# Patient Record
Sex: FEMALE
[De-identification: names, ages, dates, MRNs, and addresses within clinical notes are randomized per-mention and may not be internally consistent; named-entity substitution may affect disease eponyms.]

---

## 2019-01-11 ENCOUNTER — HOSPITAL ENCOUNTER (EMERGENCY)
Dept: HOSPITAL 97 - ER | Age: 39
Discharge: HOME | End: 2019-01-11
Payer: SELF-PAY

## 2019-01-11 VITALS — OXYGEN SATURATION: 98 % | DIASTOLIC BLOOD PRESSURE: 77 MMHG | SYSTOLIC BLOOD PRESSURE: 105 MMHG

## 2019-01-11 VITALS — TEMPERATURE: 98.7 F

## 2019-01-11 DIAGNOSIS — J06.9: Primary | ICD-10-CM

## 2019-01-11 DIAGNOSIS — H69.80: ICD-10-CM

## 2019-01-11 PROCEDURE — 99282 EMERGENCY DEPT VISIT SF MDM: CPT

## 2019-01-11 NOTE — EDPHYS
Physician Documentation                                                                           

 [facility]                                                                

Name: Mary Cantu                                                                                  

Age: 38 yrs                                                                                       

Sex: Female                                                                                       

: 1980                                                                                   

MRN: [id number]                                                                                   

Arrival Date: 2019                                                                          

Time: 12:57                                                                                       

Account#: [id number]                                                                            

Bed 30                                                                                            

Private MD:                                                                                       

ED Physician Danish Cardozo                                                                       

HPI:                                                                                              

                                                                                             

13:41 This 38 yrs old  Female presents to ER via Ambulatory with complaints of        jr8 

      Shortness Of Breath, Cough.                                                                 

13:41 The patient or guardian reports cough, that is intermittent, described as mild, with no jr8 

      sputum. Onset: The symptoms/episode began/occurred gradually, 1 week(s) ago. Severity       

      of symptoms: At their worst the symptoms were mild, in the emergency department the         

      symptoms are unchanged. Modifying factors: The symptoms are alleviated by nothing, the      

      symptoms are aggravated by nothing. Associated signs and symptoms: Pertinent positives:     

      rhinorrhea, sore throat. The patient has not experienced similar symptoms in the past.      

      The patient has been recently seen by a physician:. was put on amoxil but still having      

      ear pain and congestion .                                                                   

                                                                                                  

OB/GYN:                                                                                           

13:21 LMP 2018                                                                          ph  

                                                                                                  

Historical:                                                                                       

- Allergies:                                                                                      

13:21 Motrin;                                                                                 ph  

- Home Meds:                                                                                      

13:21 None [Active];                                                                          ph  

- PMHx:                                                                                           

13:21 High Cholesterol;                                                                       ph  

- PSHx:                                                                                           

13:21 None;                                                                                   ph  

                                                                                                  

- Immunization history:: Adult Immunizations unknown.                                             

- Social history:: Smoking status: Patient/guardian denies using tobacco.                         

- Ebola Screening: : No symptoms or risks identified at this time.                                

                                                                                                  

                                                                                                  

ROS:                                                                                              

13:41 Eyes: Negative for injury, pain, redness, and discharge, Neck: Negative for injury,     jr8 

      pain, and swelling, Cardiovascular: Negative for chest pain, palpitations, and edema,       

      Abdomen/GI: Negative for abdominal pain, nausea, vomiting, diarrhea, and constipation,      

      Back: Negative for injury and pain, MS/Extremity: Negative for injury and deformity,        

      Skin: Negative for injury, rash, and discoloration, Neuro: Negative for headache,           

      weakness, numbness, tingling, and seizure.                                                  

13:41 ENT: Positive for ear pain, rhinorrhea, sinus congestion, sore throat, Negative for         

      drainage from ear(s), sinus pain.                                                           

13:41 Respiratory: Positive for cough, Negative for shortness of breath, sputum production,       

      wheezing.                                                                                   

                                                                                                  

Exam:                                                                                             

13:41 Eyes:  Pupils equal round and reactive to light, extra-ocular motions intact.  Lids and jr8 

      lashes normal.  Conjunctiva and sclera are non-icteric and not injected.  Cornea within     

      normal limits.  Periorbital areas with no swelling, redness, or edema. ENT:  Nares          

      patent. No nasal discharge, no septal abnormalities noted.  Tympanic membranes are          

      normal and external auditory canals are clear.  Oropharynx with no redness, swelling,       

      or masses, exudates, or evidence of obstruction, uvula midline.  Mucous membranes           

      moist. Neck:  Trachea midline, no thyromegaly or masses palpated, and no cervical           

      lymphadenopathy.  Supple, full range of motion without nuchal rigidity, or vertebral        

      point tenderness.  No Meningismus. Cardiovascular:  Regular rate and rhythm with a          

      normal S1 and S2.  No gallops, murmurs, or rubs.  Normal PMI, no JVD.  No pulse             

      deficits. Respiratory:  Lungs have equal breath sounds bilaterally, clear to                

      auscultation and percussion.  No rales, rhonchi or wheezes noted.  No increased work of     

      breathing, no retractions or nasal flaring. Abdomen/GI:  Soft, non-tender, with normal      

      bowel sounds.  No distension or tympany.  No guarding or rebound.  No evidence of           

      tenderness throughout. Back:  No spinal tenderness.  No costovertebral tenderness.          

      Full range of motion. Skin:  Warm, dry with normal turgor.  Normal color with no            

      rashes, no lesions, and no evidence of cellulitis. MS/ Extremity:  Pulses equal, no         

      cyanosis.  Neurovascular intact.  Full, normal range of motion. Neuro:  Awake and           

      alert, GCS 15, oriented to person, place, time, and situation.  Cranial nerves II-XII       

      grossly intact.  Motor strength 5/5 in all extremities.  Sensory grossly intact.            

      Cerebellar exam normal.  Normal gait.                                                       

                                                                                                  

Vital Signs:                                                                                      

13:21  / 97; Pulse 76; Resp 20; Temp 98.7; Pulse Ox 100% on R/A; Weight 58.97 kg;       ph  

14:12  / 77; Pulse 81; Resp 18; Pulse Ox 98% ;                                          tl3 

                                                                                                  

MDM:                                                                                              

13:24 Patient medically screened.                                                             jr8 

13:41 Data reviewed: vital signs, nurses notes, and as a result, I will discharge patient.    jr8 

      Data interpreted: Pulse oximetry: on room air is 100 %. Interpretation: normal.             

      Counseling: I had a detailed discussion with the patient and/or guardian regarding: the     

      historical points, exam findings, and any diagnostic results supporting the                 

      discharge/admit diagnosis, the need for outpatient follow up, a family practitioner, to     

      return to the emergency department if symptoms worsen or persist or if there are any        

      questions or concerns that arise at home.                                                   

                                                                                                  

Administered Medications:                                                                         

No medications were administered                                                                  

                                                                                                  

                                                                                                  

Disposition:                                                                                      

                                                                                             

09:23 Co-signature as Attending Physician, Danish Cardozo MD.                                    

                                                                                                  

Disposition:                                                                                      

19 13:44 Discharged to Home. Impression: Eustachian Tube Dysfunction , Acute upper          

  respiratory infection, unspecified.                                                             

- Condition is Stable.                                                                            

- Discharge Instructions: Upper Respiratory Infection, Adult.                                     

- Prescriptions for Prednisone 20 mg Oral Tablet - take 1 tablet by ORAL route once               

  daily for 5 days; 5 tablet. Claritin- D 24 Hour  mg Oral Tablet Sustained                 

  Release 24 hr - take 1 tablet by ORAL route once daily As needed; 20 tablet.                    

  Guaifenesin AC 10- 100 mg/5 mL Oral Liquid - take 10 milliliter by ORAL route every 4           

  hours As needed; 240 milliliter.                                                                

- Medication Reconciliation Form, Thank You Letter, Antibiotic Education, Prescription            

  Opioid Use, Work release form form.                                                             

- Follow up: Private Physician; When: As needed; Reason: Recheck today's complaints,              

  Continuance of care, Re-evaluation by your physician.                                           

- Problem is new.                                                                                 

- Symptoms have improved.                                                                         

                                                                                                  

                                                                                                  

                                                                                                  

Signatures:                                                                                       

Solo Turner PA PA   jr8                                                  

Ame Martínez RN RN                                                      

Danish Cardozo MD MD                                                      

Krissy Mohr RN                       RN   tl3                                                  

                                                                                                  

Corrections: (The following items were deleted from the chart)                                    

                                                                                             

14:15 13:44 2019 13:44 Discharged to Home. Impression: Eustachian Tube Dysfunction ;    tl3 

      Acute upper respiratory infection, unspecified. Condition is Stable. Forms are              

      Medication Reconciliation Form, Thank You Letter, Antibiotic Education, Prescription        

      Opioid Use. Follow up: Private Physician; When: As needed; Reason: Recheck today's          

      complaints, Continuance of care, Re-evaluation by your physician. Problem is new.           

      Symptoms have improved. jr8                                                                 

                                                                                                  

**************************************************************************************************

## 2019-01-11 NOTE — ER
Nurse's Notes                                                                                     

 [facility]                                                                

Name: Mary Cantu                                                                                  

Age: 38 yrs                                                                                       

Sex: Female                                                                                       

: 1980                                                                                   

MRN: [id number]                                                                                   

Arrival Date: 2019                                                                          

Time: 12:57                                                                                       

Account#: [id number]                                                                            

Bed 30                                                                                            

Private MD:                                                                                       

Diagnosis: Eustachian Tube Dysfunction ;Acute upper respiratory infection, unspecified            

                                                                                                  

Presentation:                                                                                     

                                                                                             

13:19 Presenting complaint: Patient states: Productive cough, chloe ear pain, SOB, and sore     ph  

      throat x 1 week, states, " I went to the DR and they gave me some antibiotics but they      

      aren't helping" Pt currently taking Augmentin and Montelukast, denies fever, N/V/D.         

      Transition of care: patient was not received from another setting of care. Onset of         

      symptoms was 2019. Risk Assessment: Do you want to hurt yourself or someone     

      else? Patient reports no desire to harm self or others. Initial Sepsis Screen: Does the     

      patient meet any 2 criteria? No. Patient's initial sepsis screen is negative. Does the      

      patient have a suspected source of infection? No. Patient's initial sepsis screen is        

      negative. Care prior to arrival: None.                                                      

13:19 Method Of Arrival: Ambulatory                                                           ph  

13:19 Acuity: GASPER 4                                                                           ph  

                                                                                                  

Triage Assessment:                                                                                

14:14 Respiratory: Onset: The symptoms/episode began/occurred three days, the patient has     tl3 

      moderate shortness of breath.                                                               

                                                                                                  

OB/GYN:                                                                                           

13:21 LMP 2018                                                                          ph  

                                                                                                  

Historical:                                                                                       

- Allergies:                                                                                      

13:21 Motrin;                                                                                 ph  

- Home Meds:                                                                                      

13:21 None [Active];                                                                          ph  

- PMHx:                                                                                           

13:21 High Cholesterol;                                                                       ph  

- PSHx:                                                                                           

13:21 None;                                                                                   ph  

                                                                                                  

- Immunization history:: Adult Immunizations unknown.                                             

- Social history:: Smoking status: Patient/guardian denies using tobacco.                         

- Ebola Screening: : No symptoms or risks identified at this time.                                

                                                                                                  

                                                                                                  

Screenin:12 Abuse screen: Denies threats or abuse. Nutritional screening: No deficits noted.        tl3 

      Tuberculosis screening: No symptoms or risk factors identified. Fall Risk None              

      identified.                                                                                 

                                                                                                  

Assessment:                                                                                       

13:30 Reassessment: pt was seen by PCP yesterday and put on Singulair and Augmentin, S/S      tl3 

      persisting not feeling any better. General: Appears in no apparent distress.                

      comfortable, well groomed, well developed, well nourished, Behavior is calm,                

      cooperative, appropriate for age. Pain: Denies pain. Neuro: Level of Consciousness is       

      awake, alert, obeys commands, Oriented to person, place, time, situation, Appropriate       

      for age. Cardiovascular: Patient's skin is warm and dry. Rhythm is regular.                 

      Cardiovascular: Heart tones S1 S2 present. Respiratory: Airway is patent Respiratory        

      effort is even, unlabored, Respiratory pattern is regular, symmetrical, Breath sounds       

      are clear bilaterally. Respiratory: Reports cough that is. GI: No signs and/or symptoms     

      were reported involving the gastrointestinal system. : No signs and/or symptoms were      

      reported regarding the genitourinary system. EENT: Reports nasal congestion. Derm: No       

      signs and/or symptoms reported regarding the dermatologic system.                           

14:12 Reassessment: Patient appears in no apparent distress at this time. No changes from     tl3 

      previously documented assessment. Patient and/or family updated on plan of care and         

      expected duration. Pain level reassessed. Patient is alert, oriented x 3, equal             

      unlabored respirations, skin warm/dry/pink.                                                 

                                                                                                  

Vital Signs:                                                                                      

13:21  / 97; Pulse 76; Resp 20; Temp 98.7; Pulse Ox 100% on R/A; Weight 58.97 kg;       ph  

14:12  / 77; Pulse 81; Resp 18; Pulse Ox 98% ;                                          tl3 

                                                                                                  

ED Course:                                                                                        

12:57 Patient arrived in ED.                                                                  as  

13:18 Krissy Mohr, RN is Primary Nurse.                                                     tl3 

13:21 Triage completed.                                                                       ph  

13:22 Arm band placed on Patient placed in an exam room.                                      ph  

13:24 [facility]                                                               jr8 

13:24 Danish Cardozo MD is Attending Physician.                                              jr8 

14:12 Patient has correct armband on for positive identification. Diet tray ordered.          tl3 

14:12 No provider procedures requiring assistance completed. Patient did not have IV access   tl3 

      during this emergency room visit.                                                           

                                                                                                  

Administered Medications:                                                                         

No medications were administered                                                                  

                                                                                                  

                                                                                                  

Outcome:                                                                                          

13:44 Discharge ordered by MD.                                                                jr8 

14:12 Discharged to home ambulatory.                                                          tl3 

14:12 Condition: stable                                                                           

14:12 Discharge instructions given to patient, Instructed on discharge instructions, follow       

      up and referral plans. medication usage, Demonstrated understanding of instructions,        

      follow-up care, medications, Prescriptions given X 3.                                       

14:15 Patient left the ED.                                                                    tl3 

                                                                                                  

Signatures:                                                                                       

Georgiana Douglas Josh, PA PA   jr8                                                  

Ame Martínez, RN                      RN   ph                                                   

Krissy Mohr RN                       RN   tl3                                                  

                                                                                                  

**************************************************************************************************

## 2020-02-16 ENCOUNTER — HOSPITAL ENCOUNTER (EMERGENCY)
Dept: HOSPITAL 97 - ER | Age: 40
Discharge: HOME | End: 2020-02-16
Payer: SELF-PAY

## 2020-02-16 VITALS — TEMPERATURE: 98.6 F | DIASTOLIC BLOOD PRESSURE: 76 MMHG | SYSTOLIC BLOOD PRESSURE: 113 MMHG

## 2020-02-16 VITALS — OXYGEN SATURATION: 100 %

## 2020-02-16 DIAGNOSIS — J10.1: Primary | ICD-10-CM

## 2020-02-16 DIAGNOSIS — Z88.6: ICD-10-CM

## 2020-02-16 DIAGNOSIS — E78.00: ICD-10-CM

## 2020-02-16 PROCEDURE — 99282 EMERGENCY DEPT VISIT SF MDM: CPT

## 2020-02-16 NOTE — ER
Nurse's Notes                                                                                     

 [facility]                                                                 

Name: Mary Cantu                                                                                  

Age: 39 yrs                                                                                       

Sex: Female                                                                                       

: 1980                                                                                   

MRN: [id number]                                                                                   

Arrival Date: 2020                                                                          

Time: 16:16                                                                                       

Account#: [id number]                                                                            

Bed 7                                                                                             

Private MD:                                                                                       

Diagnosis: Influenza due to certain identified influenza viruses                                  

                                                                                                  

Presentation:                                                                                     

                                                                                             

16:17 Presenting complaint: Patient states: Fever. chills, cough, congestion, and sneezing    aj1 

      since yesterday. States that she went to the doctor yesterday and she was diagnosed         

      with a cold. States that she was tested for the flu and it came back negative. States       

      that she came to the emergency room because she doesn't feel better yet. Transition of      

      care: patient was not received from another setting of care. Onset of symptoms was          

      2020. Risk Assessment: Do you want to hurt yourself or someone else? Patient       

      reports no desire to harm self or others. Initial Sepsis Screen: Does the patient meet      

      any 2 criteria? No. Patient's initial sepsis screen is negative. Does the patient have      

      a suspected source of infection? Yes: Productive cough/pneumonia. Care prior to             

      arrival: None.                                                                              

16:17 Method Of Arrival: Ambulatory                                                           aj 

16:17 Acuity: GASPER 5                                                                           aj1 

                                                                                                  

Triage Assessment:                                                                                

16:19 General: Appears in no apparent distress. comfortable, Behavior is calm, cooperative,   aj1 

      appropriate for age. Pain: Pain currently is 2 out of 10 on a pain scale. Neuro: Level      

      of Consciousness is awake, alert, obeys commands. Cardiovascular: Patient's skin is         

      warm and dry. Respiratory: Airway is patent Respiratory effort is even, unlabored,          

      Respiratory pattern is regular, symmetrical.                                                

                                                                                                  

OB/GYN:                                                                                           

16:19 LMP 2020                                                                           aj1 

                                                                                                  

Historical:                                                                                       

- Allergies:                                                                                      

16:19 Motrin;                                                                                 aj1 

- Home Meds:                                                                                      

16:19 None [Active];                                                                          aj1 

- PMHx:                                                                                           

16:19 High Cholesterol;                                                                       aj1 

- PSHx:                                                                                           

16:19 None;                                                                                   aj1 

                                                                                                  

- Immunization history:: Flu vaccine is up to date.                                               

- Coronavirus screen:: The patient has NOT traveled to [place] in the past 14 days.                 

- Social history:: Smoking status: Patient/guardian denies using tobacco.                         

- Ebola Screening: : Patient denies travel to an Ebola-affected area in the 21 days               

  before illness onset.                                                                           

                                                                                                  

                                                                                                  

Screenin:41 Abuse screen: Denies threats or abuse. Denies injuries from another. Nutritional        mg2 

      screening: No deficits noted. Tuberculosis screening: No symptoms or risk factors           

      identified. Fall Risk None identified.                                                      

                                                                                                  

Assessment:                                                                                       

16:40 General: Appears in no apparent distress. comfortable, Behavior is calm, cooperative.   mg2 

      Pain: Denies pain. Neuro: Level of Consciousness is awake, alert, obeys commands,           

      Oriented to person, place, time, situation. Cardiovascular: Capillary refill < 3            

      seconds Patient's skin is warm and dry. Respiratory: Airway is patent Respiratory           

      effort is even, unlabored, Respiratory pattern is regular, symmetrical. Respiratory:        

      Reports cough that is. GI: No signs and/or symptoms were reported involving the             

      gastrointestinal system. : No signs and/or symptoms were reported regarding the           

      genitourinary system. EENT: No signs and/or symptoms were reported regarding the EENT       

      system. Derm: Skin is intact, is healthy with good turgor, Skin is pink, warm \T\ dry.      

      normal. Musculoskeletal: Circulation, motion, and sensation intact. Capillary refill <      

      3 seconds.                                                                                  

                                                                                                  

Vital Signs:                                                                                      

16:19  / 82; Pulse 75; Resp 18; Temp 98.0; Pulse Ox 100% on R/A; Weight 59.42 kg (R);   aj1 

      Height 4 ft. 9 in. (144.78 cm) (R); Pain 3/10;                                              

16:41  / 76; Pulse 80; Resp 18; Temp 98.6(TE); Pulse Ox 100% on R/A;                    mg2 

16:19 Body Mass Index 28.35 (59.42 kg, 144.78 cm)                                             aj1 

                                                                                                  

ED Course:                                                                                        

16:16 Patient arrived in ED.                                                                  mr  

16:19 Triage completed.                                                                       aj1 

16:19 Arm band placed on Patient placed in waiting room, Patient notified of wait time.       aj1 

16:22 Solo Turner PA is PHCP.                                                               jr8 

16:22 Minh Rankin MD is Attending Physician.                                             jr8 

16:32 Luis Lo, PARMJIT is Primary Nurse.                                                  mg2 

16:41 Patient has correct armband on for positive identification. Door closed.                mg2 

16:41 No provider procedures requiring assistance completed. Patient did not have IV access   mg2 

      during this emergency room visit.                                                           

                                                                                                  

Administered Medications:                                                                         

No medications were administered                                                                  

                                                                                                  

                                                                                                  

Outcome:                                                                                          

16:29 Discharge ordered by MD.                                                                jr8 

16:41 Discharged to home ambulatory.                                                          mg2 

16:41 Condition: stable                                                                           

16:41 Discharge instructions given to patient, Instructed on discharge instructions, follow       

      up and referral plans. medication usage, Demonstrated understanding of instructions,        

      follow-up care, medications, Prescriptions given X 3.                                       

16:42 Patient left the ED.                                                                    mg2 

                                                                                                  

Signatures:                                                                                       

Kym Gray RN                     RN   aj1                                                  

Salina Mcneal                                 mr                                                   

Solo Turner PA                        PA   jr8                                                  

Luis Lo RN                    RN   mg2                                                  

                                                                                                  

**************************************************************************************************

## 2020-02-16 NOTE — EDPHYS
Physician Documentation                                                                           

 [facility]                                                                 

Name: Mary Cantu                                                                                  

Age: 39 yrs                                                                                       

Sex: Female                                                                                       

: 1980                                                                                   

MRN: [id number]                                                                                   

Arrival Date: 2020                                                                          

Time: 16:16                                                                                       

Account#: [id number]                                                                            

Bed 7                                                                                             

Private MD:                                                                                       

ED Physician Minh Rankin                                                                      

HPI:                                                                                              

                                                                                             

16:27 This 39 yrs old  Female presents to ER via Ambulatory with complaints of Flu    jr8 

      Symptoms.                                                                                   

16:27 Onset: The symptoms/episode began/occurred acutely, yesterday. Associated signs and     jr8 

      symptoms: Pertinent positives: cough, fever, sore throat. Modifying factors: The            

      patient symptoms are alleviated by nothing, the patient symptoms are aggravated by          

      nothing. The patient has not experienced similar symptoms in the past. The patient has      

      been recently seen by a physician:. saw pcp yesterday and was flu swabbed. Was              

      negative. Given two shots and sent home. Stated that she feels no better and wants          

      reevaluation. Son of patient recently diagnosed with influenza in this ED .                 

                                                                                                  

OB/GYN:                                                                                           

16:19 LMP 2020                                                                           aj1 

                                                                                                  

Historical:                                                                                       

- Allergies:                                                                                      

16:19 Motrin;                                                                                 aj1 

- Home Meds:                                                                                      

16:19 None [Active];                                                                          aj1 

- PMHx:                                                                                           

16:19 High Cholesterol;                                                                       aj1 

- PSHx:                                                                                           

16:19 None;                                                                                   aj1 

                                                                                                  

- Immunization history:: Flu vaccine is up to date.                                               

- Coronavirus screen:: The patient has NOT traveled to [place] in the past 14 days.                 

- Social history:: Smoking status: Patient/guardian denies using tobacco.                         

- Ebola Screening: : Patient denies travel to an Ebola-affected area in the 21 days               

  before illness onset.                                                                           

                                                                                                  

                                                                                                  

ROS:                                                                                              

16:27 Eyes: Negative for injury, pain, redness, and discharge, Neck: Negative for injury,     jr8 

      pain, and swelling, Cardiovascular: Negative for chest pain, palpitations, and edema,       

      Abdomen/GI: Negative for abdominal pain, nausea, vomiting, diarrhea, and constipation,      

      Back: Negative for injury and pain, MS/Extremity: Negative for injury and deformity,        

      Skin: Negative for injury, rash, and discoloration.                                         

16:27 Constitutional: Positive for body aches, chills, fever, malaise.                            

16:27 ENT: Positive for rhinorrhea, sinus congestion, sore throat.                                

16:27 Respiratory: Positive for cough, Negative for dyspnea on exertion, shortness of breath,     

      sputum production, wheezing.                                                                

16:27 Neuro: Positive for headache.                                                               

                                                                                                  

Exam:                                                                                             

16:27 Constitutional:  This is a well developed, well nourished patient who is awake, alert,  jr8 

      and in no acute distress. Eyes:  Pupils equal round and reactive to light, extra-ocular     

      motions intact.  Lids and lashes normal.  Conjunctiva and sclera are non-icteric and        

      not injected.  Cornea within normal limits.  Periorbital areas with no swelling,            

      redness, or edema. Neck:  Trachea midline, no thyromegaly or masses palpated, and no        

      cervical lymphadenopathy.  Supple, full range of motion without nuchal rigidity, or         

      vertebral point tenderness.  No Meningismus. Cardiovascular:  Regular rate and rhythm       

      with a normal S1 and S2.  No gallops, murmurs, or rubs.  Normal PMI, no JVD.  No pulse      

      deficits. Respiratory:  Lungs have equal breath sounds bilaterally, clear to                

      auscultation and percussion.  No rales, rhonchi or wheezes noted.  No increased work of     

      breathing, no retractions or nasal flaring. Abdomen/GI:  Soft, non-tender, with normal      

      bowel sounds.  No distension or tympany.  No guarding or rebound.  No evidence of           

      tenderness throughout. Back:  No spinal tenderness.  No costovertebral tenderness.          

      Full range of motion. Skin:  Warm, dry with normal turgor.  Normal color with no            

      rashes, no lesions, and no evidence of cellulitis. MS/ Extremity:  Pulses equal, no         

      cyanosis.  Neurovascular intact.  Full, normal range of motion. Neuro:  Awake and           

      alert, GCS 15, oriented to person, place, time, and situation.  Cranial nerves II-XII       

      grossly intact.  Motor strength 5/5 in all extremities.  Sensory grossly intact.            

      Cerebellar exam normal.  Normal gait.                                                       

16:27 ENT: Exam is negative for earache, ear discharge, TM abnormalities, nasal discharge,        

      Mouth: Lips: moist, Oral mucosa: pink and intact, moist, Gums: pink, Tongue: is moist,      

      Posterior pharynx: Airway: patent, Tonsils: are normal in appearance, no enlargement,       

      no erythema, no exudate, no ulcerations, Uvula: midline, non-edematous, no erythema,        

      swelling, is not appreciated, erythema, that is mild.                                       

                                                                                                  

Vital Signs:                                                                                      

16:19  / 82; Pulse 75; Resp 18; Temp 98.0; Pulse Ox 100% on R/A; Weight 59.42 kg (R);   aj1 

      Height 4 ft. 9 in. (144.78 cm) (R); Pain 3/10;                                              

16:41  / 76; Pulse 80; Resp 18; Temp 98.6(TE); Pulse Ox 100% on R/A;                    mg2 

16:19 Body Mass Index 28.35 (59.42 kg, 144.78 cm)                                             aj1 

                                                                                                  

MDM:                                                                                              

16:23 Patient medically screened.                                                             jr8 

16:27 Data reviewed: vital signs, nurses notes, and as a result, I will discharge patient.    jr8 

      Data interpreted: Pulse oximetry: on room air is 100 %. Interpretation: normal.             

      Counseling: I had a detailed discussion with the patient and/or guardian regarding: the     

      historical points, exam findings, and any diagnostic results supporting the                 

      discharge/admit diagnosis, the need for outpatient follow up, a family practitioner, to     

      return to the emergency department if symptoms worsen or persist or if there are any        

      questions or concerns that arise at home.                                                   

                                                                                                  

Administered Medications:                                                                         

No medications were administered                                                                  

                                                                                                  

                                                                                                  

Disposition:                                                                                      

                                                                                             

08:13 Co-signature as Attending Physician, Minh Rankin MD I agree with the assessment and  brigid 

      plan of care.                                                                               

                                                                                                  

Disposition:                                                                                      

20 16:29 Discharged to Home. Impression: Influenza due to certain identified influenza      

  viruses.                                                                                        

- Condition is Stable.                                                                            

- Discharge Instructions: Influenza, Adult.                                                       

- Prescriptions for Tamiflu 75 mg Oral Capsule - take 1 capsule by ORAL route every 12            

  hours for 5 days; 10 capsule. Prednisone 20 mg Oral Tablet - take 2 tablet by ORAL              

  route once daily for 5 days; 10 tablet. Guaifenesin AC 10- 100 mg/5 mL Oral Liquid -            

  take 10 milliliter by ORAL route every 4 hours As needed; 240 milliliter.                       

- Medication Reconciliation Form, Thank You Letter, Antibiotic Education, Prescription            

  Opioid Use, Work release form form.                                                             

- Follow up: Private Physician; When: 5 - 6 days; Reason: Recheck today's complaints,             

  Continuance of care, Re-evaluation by your physician.                                           

- Problem is new.                                                                                 

- Symptoms have improved.                                                                         

                                                                                                  

                                                                                                  

                                                                                                  

Signatures:                                                                                       

Kym Gray RN                     RN   aj1                                                  

Minh Rankin MD MD cha Roszak, Josh, PA                        PA   jr8                                                  

Luis Lo RN                    RN   mg2                                                  

                                                                                                  

Corrections: (The following items were deleted from the chart)                                    

                                                                                             

16:42 16:29 2020 16:29 Discharged to Home. Impression: Influenza due to certain         mg2 

      identified influenza viruses. Condition is Stable. Forms are Medication Reconciliation      

      Form, Thank You Letter, Antibiotic Education, Prescription Opioid Use. Follow up:           

      Private Physician; When: 5 - 6 days; Reason: Recheck today's complaints, Continuance of     

      care, Re-evaluation by your physician. Problem is new. Symptoms have improved. jr8          

                                                                                                  

**************************************************************************************************

## 2022-12-06 ENCOUNTER — HOSPITAL ENCOUNTER (EMERGENCY)
Dept: HOSPITAL 97 - ER | Age: 42
Discharge: HOME | End: 2022-12-06
Payer: SELF-PAY

## 2022-12-06 DIAGNOSIS — R07.89: Primary | ICD-10-CM

## 2022-12-06 DIAGNOSIS — Z88.6: ICD-10-CM

## 2022-12-06 LAB
ALBUMIN SERPL BCP-MCNC: 4 G/DL (ref 3.4–5)
ALP SERPL-CCNC: 61 U/L (ref 45–117)
ALT SERPL W P-5'-P-CCNC: 25 U/L (ref 12–78)
AST SERPL W P-5'-P-CCNC: 19 U/L (ref 15–37)
BUN BLD-MCNC: 12 MG/DL (ref 7–18)
GLUCOSE SERPLBLD-MCNC: 94 MG/DL (ref 74–106)
HCT VFR BLD CALC: 36.7 % (ref 36–45)
INR BLD: 0.97
LYMPHOCYTES # SPEC AUTO: 2.2 K/UL (ref 0.7–4.9)
MAGNESIUM SERPL-MCNC: 2.4 MG/DL (ref 1.8–2.4)
MCV RBC: 89.3 FL (ref 80–100)
NT-PROBNP SERPL-MCNC: 29 PG/ML (ref ?–125)
PMV BLD: 7.9 FL (ref 7.6–11.3)
POTASSIUM SERPL-SCNC: 3.4 MMOL/L (ref 3.5–5.1)
RBC # BLD: 4.11 M/UL (ref 3.86–4.86)
TROPONIN I SERPL HS-MCNC: 3.5 PG/ML (ref ?–58.9)

## 2022-12-06 PROCEDURE — 83880 ASSAY OF NATRIURETIC PEPTIDE: CPT

## 2022-12-06 PROCEDURE — 80076 HEPATIC FUNCTION PANEL: CPT

## 2022-12-06 PROCEDURE — 93005 ELECTROCARDIOGRAM TRACING: CPT

## 2022-12-06 PROCEDURE — 36415 COLL VENOUS BLD VENIPUNCTURE: CPT

## 2022-12-06 PROCEDURE — 85610 PROTHROMBIN TIME: CPT

## 2022-12-06 PROCEDURE — 83735 ASSAY OF MAGNESIUM: CPT

## 2022-12-06 PROCEDURE — 84484 ASSAY OF TROPONIN QUANT: CPT

## 2022-12-06 PROCEDURE — 99284 EMERGENCY DEPT VISIT MOD MDM: CPT

## 2022-12-06 PROCEDURE — 85025 COMPLETE CBC W/AUTO DIFF WBC: CPT

## 2022-12-06 PROCEDURE — 71045 X-RAY EXAM CHEST 1 VIEW: CPT

## 2022-12-06 PROCEDURE — 80048 BASIC METABOLIC PNL TOTAL CA: CPT

## 2022-12-06 NOTE — RAD REPORT
EXAM DESCRIPTION:  [facility]ill[place] Single View12/6/2022 4:07 pm

 

CLINICAL HISTORY:  Chest pain

 

COMPARISON:  2015

 

FINDINGS:   The lungs appear clear of acute infiltrate. The heart is normal size

 

IMPRESSION:   No acute abnormalities displayed

## 2022-12-06 NOTE — ER
Nurse's Notes                                                                                     

 [facility]                                                                 

Name: Mary Cantu                                                                                  

Age: 42 yrs                                                                                       

Sex: Female                                                                                       

: 1980                                                                                   

MRN: [id number]                                                                                   

Arrival Date: 2022                                                                          

Time: 15:33                                                                                       

Account#: [id number]                                                                            

Bed 17                                                                                            

Private MD:                                                                                       

Diagnosis: Chest pain, unspecified                                                                

                                                                                                  

Presentation:                                                                                     

                                                                                             

15:41 Chief complaint: Patient states: chest pain since 0530 this morning on and off that     jh5 

      feels like heavy pressure and squeezing. Coronavirus screen: Vaccine status: Patient        

      reports receiving the 2nd dose of the covid vaccine. Client denies travel out of the        

      [place] in the last 14 days. Ebola Screen: Patient negative for fever greater than or          

      equal to 101.5 degrees Fahrenheit, and additional compatible Ebola Virus Disease            

      symptoms Patient denies exposure to infectious person. Patient denies travel to an          

      Ebola-affected area in the 21 days before illness onset. Initial Sepsis Screen: Does        

      the patient meet any 2 criteria? No. Patient's initial sepsis screen is negative. Does      

      the patient have a suspected source of infection? No. Patient's initial sepsis screen       

      is negative. Risk Assessment: Do you want to hurt yourself or someone else? Patient         

      reports no desire to harm self or others. Onset of symptoms was 2022.          

15:41 Method Of Arrival: Ambulatory                                                           [facility] 

15:41 Acuity: GASPER 3                                                                           [facility] 

                                                                                                  

Triage Assessment:                                                                                

15:43 General: Appears in no apparent distress. uncomfortable, slender, well groomed, well    jh 

      developed, Behavior is calm, cooperative, appropriate for age. Pain: Complains of pain      

      in chest. Cardiovascular: Reports chest pain.                                               

                                                                                                  

OB/GYN:                                                                                           

15:43 LMP 2022                                                                           [facility] 

                                                                                                  

Historical:                                                                                       

- Allergies:                                                                                      

15:43 Motrin;                                                                                 [facility] 

- PMHx:                                                                                           

15:43 High Cholesterol;                                                                       [facility] 

- PSHx:                                                                                           

15:44 tubal ;                                                                             [facility] 

                                                                                                  

- Immunization history:: Adult Immunizations up to date.                                          

- Social history:: Smoking status: Patient denies any tobacco usage or history of.                

                                                                                                  

                                                                                                  

Screenin:01 Abuse screen: Denies threats or abuse. Nutritional screening: No deficits noted.        vg1 

      Tuberculosis screening: No symptoms or risk factors identified. Fall Risk No fall in        

      past 12 months (0 pts). No secondary diagnosis (0 pts). IV access (20 points).              

      Ambulatory Aid- None/Bed Rest/Nurse Assist (0 pts). Gait- Normal/Bed Rest/Wheelchair (0     

      pts) Mental Status- Oriented to own ability (0 pts). Total Vargas Fall Scale indicates       

      No Risk (0-24 pts).                                                                         

                                                                                                  

Assessment:                                                                                       

15:59 General: Appears in no apparent distress. comfortable, Behavior is calm, cooperative.   vg1 

      Pain: Complains of pain in anterior aspect of left upper chest Pain does not radiate.       

      Pain currently is 7 out of 10 on a pain scale. Quality of pain is described as              

      pressure, Pain began this morning 0515. Neuro: Level of Consciousness is awake, alert,      

      obeys commands, Oriented to person, place, time, situation. Cardiovascular: Denies          

      nausea, palpitations, shortness of breath, Patient's skin is warm and dry. Respiratory:     

      Airway is patent Respiratory effort is even, unlabored. GI: Abdomen is flat, Patient        

      currently denies nausea, vomiting. : No signs and/or symptoms were reported regarding     

      the genitourinary system. EENT: No signs and/or symptoms were reported regarding the        

      EENT system. Derm: Skin is pink, warm \T\ dry. Musculoskeletal: Circulation, motion, and    

      sensation intact.                                                                           

16:43 Reassessment: Patient appears in no apparent distress at this time. No changes from     vg1 

      previously documented assessment. Patient and/or family updated on plan of care and         

      expected duration. Pain level reassessed. Patient is alert, oriented x 3, equal             

      unlabored respirations, skin warm/dry/pink.                                                 

17:45 Reassessment: Patient appears in no apparent distress at this time. No changes from     vg1 

      previously documented assessment. Patient and/or family updated on plan of care and         

      expected duration. Pain level reassessed. Patient is alert, oriented x 3, equal             

      unlabored respirations, skin warm/dry/pink.                                                 

18:45 Reassessment: Patient appears in no apparent distress at this time. No changes from     vg1 

      previously documented assessment. Patient is alert, oriented x 3, equal unlabored           

      respirations, skin warm/dry/pink.                                                           

                                                                                                  

Vital Signs:                                                                                      

15:41  / 86; Pulse 67; Resp 18; Temp 98.6; Pulse Ox 98% on R/A; Weight 57.15 kg; Height jh5 

      4 ft. 10 in. (147.32 cm); Pain 6/10;                                                        

15:59  / 86; Pulse 74; Resp 15; Pulse Ox 100% on R/A;                                   vg1 

16:43  / 85; Pulse 74; Resp 16; Pulse Ox 100% on R/A;                                   vg1 

17:45  / 82; Pulse 64; Resp 14; Pulse Ox 99% on R/A;                                    vg1 

20:35  / 88; Pulse 72; Resp 17; Pulse Ox 100% on R/A;                                   kd3 

15:41 Body Mass Index 26.33 (57.15 kg, 147.32 cm)                                             5 

                                                                                                  

ED Course:                                                                                        

15:33 Patient arrived in ED.                                                                  rg4 

15:39 [facility]                                                          snw 

15:39 Bobby Chew MD is Attending Physician.                                              snw 

15:43 Annemarie Whaley, RN is Primary Nurse.                                                  vg1 

15:43 Triage completed.                                                                       jh5 

15:43 Arm band placed on right wrist.                                                         jh5 

16:01 Patient has correct armband on for positive identification. Placed in gown. Bed in low  vg1 

      position. Call light in reach. Side rails up X 1. Adult w/ patient. Client placed on        

      continuous cardiac and pulse oximetry monitoring. NIBP monitoring applied.                  

16:01 Patient maintains SpO2 saturation greater than 95% on room air.                         vg1 

16:09 XRAY Chest (1 view) In Process Unspecified.                                             EDMS

16:14 Initial lab(s) drawn, by me, sent to lab. Inserted saline lock: 20 gauge in right       vg1 

      antecubital area, using aseptic technique. Blood collected.                                 

19:20 Troponin High Sensitivity Sent.                                                         vg1 

20:35 No provider procedures requiring assistance completed. IV discontinued, intact,         kd3 

      bleeding controlled, No redness/swelling at site. Pressure dressing applied.                

                                                                                                  

Administered Medications:                                                                         

16:02 Drug: Aspirin Chewable Tablet 324 mg Route: PO;                                         vg1 

20:36 Follow up: Response: No adverse reaction                                                kd3 

                                                                                                  

                                                                                                  

Medication:                                                                                       

16:01 VIS not applicable for this client.                                                     vg1 

                                                                                                  

Outcome:                                                                                          

20:20 Discharge ordered by MD.                                                                snw 

20:35 Discharged to home ambulatory, with family.                                             kd3 

20:35 Condition: stable                                                                           

20:35 Discharge instructions given to patient, Instructed on discharge instructions, follow       

      up and referral plans. medication usage, Demonstrated understanding of instructions,        

      follow-up care, medications, Prescriptions given X 1.                                       

20:39 Patient left the ED.                                                                    kd3 

                                                                                                  

Signatures:                                                                                       

Dispatcher [employer]                           EDMS                                                 

Gilma Best, FNP-C                   FNP-Csnw                                                  

Hilda Whaley                                 rg4                                                  

Annemarie Whaley, RN                    RN   vg1                                                  

Leeanne Ames, RN                       RN   jh5                                                  

Kylee Alonso, RN                      RN   kd3                                                  

                                                                                                  

**************************************************************************************************

## 2022-12-06 NOTE — EDPHYS
Physician Documentation                                                                           

 [facility]                                                                 

Name: Mary Cantu                                                                                  

Age: 42 yrs                                                                                       

Sex: Female                                                                                       

: 1980                                                                                   

MRN: [id number]                                                                                   

Arrival Date: 2022                                                                          

Time: 15:33                                                                                       

Account#: [id number]                                                                            

Bed 17                                                                                            

Private MD:                                                                                       

ED Physician Bobby Chew                                                                       

HPI:                                                                                              

                                                                                             

17:33 This 42 yrs old  Female presents to ER via Ambulatory with complaints of Chest  snw 

      Pain.                                                                                       

17:33 The patient or guardian reports chest pain that is located primarily in the anterior    snw 

      chest wall, left. Onset: suddenly, today. The pain does not radiate. Associated signs       

      and symptoms: The patient has no apparent associated signs or symptoms. The chest pain      

      is described as a pressure. Duration: The patient or guardian reports multiple              

      episodes. Severity of pain: At its worst the pain was moderate. The patient has not         

      experienced similar symptoms in the past. It is unknown whether or not the patient has      

      recently seen a physician.                                                                  

                                                                                                  

OB/GYN:                                                                                           

15:43 LMP 2022                                                                           [facility] 

                                                                                                  

Historical:                                                                                       

- Allergies:                                                                                      

15:43 Motrin;                                                                                 [facility] 

- PMHx:                                                                                           

15:43 High Cholesterol;                                                                       [facility] 

- PSHx:                                                                                           

15:44 tubal ;                                                                             [facility] 

                                                                                                  

- Immunization history:: Adult Immunizations up to date.                                          

- Social history:: Smoking status: Patient denies any tobacco usage or history of.                

                                                                                                  

                                                                                                  

ROS:                                                                                              

17:27 Constitutional: Negative for fever, chills, and weight loss, Eyes: Negative for injury, snw 

      pain, redness, and discharge, ENT: Negative for injury, pain, and discharge, Neck:          

      Negative for injury, pain, and swelling, Respiratory: Negative for shortness of breath,     

      cough, wheezing, and pleuritic chest pain, Abdomen/GI: Negative for abdominal pain,         

      nausea, vomiting, diarrhea, and constipation, Back: Negative for injury and pain, :       

      Negative for injury, bleeding, discharge, and swelling, MS/Extremity: Negative for          

      injury and deformity, Skin: Negative for injury, rash, and discoloration, Neuro:            

      Negative for headache, weakness, numbness, tingling, and seizure, Psych: Negative for       

      depression, anxiety, suicide ideation, homicidal ideation, and hallucinations.              

17:27 Cardiovascular: Positive for chest pain, of the anterior aspect of left upper chest.        

                                                                                                  

Exam:                                                                                             

17:28 Constitutional:  This is a well developed, well nourished patient who is awake, alert,  snw 

      and in no acute distress. Head/Face:  Normocephalic, atraumatic. Eyes:  Pupils equal        

      round and reactive to light, extra-ocular motions intact.  Lids and lashes normal.          

      Conjunctiva and sclera are non-icteric and not injected.  Cornea within normal limits.      

      Periorbital areas with no swelling, redness, or edema. ENT:  Nares patent. No nasal         

      discharge, no septal abnormalities noted.  Tympanic membranes are normal and external       

      auditory canals are clear.  Oropharynx with no redness, swelling, or masses, exudates,      

      or evidence of obstruction, uvula midline.  Mucous membranes moist. Neck:  Trachea          

      midline, no thyromegaly or masses palpated, and no cervical lymphadenopathy.  Supple,       

      full range of motion without nuchal rigidity, or vertebral point tenderness.  No            

      Meningismus. Chest/axilla:  Normal chest wall appearance and motion.  Nontender with no     

      deformity.  No lesions are appreciated. Cardiovascular:  Regular rate and rhythm with a     

      normal S1 and S2.  No gallops, murmurs, or rubs.  Normal PMI, no JVD.  No pulse             

      deficits. Respiratory:  Lungs have equal breath sounds bilaterally, clear to                

      auscultation and percussion.  No rales, rhonchi or wheezes noted.  No increased work of     

      breathing, no retractions or nasal flaring. Abdomen/GI:  Soft, non-tender, with normal      

      bowel sounds.  No distension or tympany.  No guarding or rebound.  No evidence of           

      tenderness throughout. Back:  No spinal tenderness.  No costovertebral tenderness.          

      Full range of motion. Skin:  Warm, dry with normal turgor.  Normal color with no            

      rashes, no lesions, and no evidence of cellulitis. MS/ Extremity:  Pulses equal, no         

      cyanosis.  Neurovascular intact.  Full, normal range of motion. Neuro:  Awake and           

      alert, GCS 15, oriented to person, place, time, and situation.  Cranial nerves II-XII       

      grossly intact.  Motor strength 5/5 in all extremities.  Sensory grossly intact.            

      Cerebellar exam normal.  Normal gait. Psych:  Awake, alert, with orientation to person,     

      place and time.  Behavior, mood, and affect are within normal limits.                       

                                                                                                  

Vital Signs:                                                                                      

15:41  / 86; Pulse 67; Resp 18; Temp 98.6; Pulse Ox 98% on R/A; Weight 57.15 kg; Height jh5 

      4 ft. 10 in. (147.32 cm); Pain 6/10;                                                        

15:59  / 86; Pulse 74; Resp 15; Pulse Ox 100% on R/A;                                   vg1 

16:43  / 85; Pulse 74; Resp 16; Pulse Ox 100% on R/A;                                   vg1 

17:45  / 82; Pulse 64; Resp 14; Pulse Ox 99% on R/A;                                    vg1 

20:35  / 88; Pulse 72; Resp 17; Pulse Ox 100% on R/A;                                   kd3 

15:41 Body Mass Index 26.33 (57.15 kg, 147.32 cm)                                             jh5 

                                                                                                  

MDM:                                                                                              

15:40 Patient medically screened.                                                             snw 

17:34 HEART Score: History: Slightly Suspicious (0), ECG: Normal (0), Age: < or = 45 years    snw 

      (0), Risk Factors: 1 or 2 risk factors (1), [Hypercholesterolemia] Troponin: < or = 1 x     

      Normal Limit (0), Total Score = 1. The patient was given aspirin in the Emergency           

      Department. Data reviewed: vital signs, nurses notes.                                       

                                                                                                  

                                                                                             

15:45 Order name: Basic Metabolic Panel; Complete Time: 17:01                                 snw 

                                                                                             

15:45 Order name: CBC with Diff; Complete Time: 16:25                                         snw 

                                                                                             

15:45 Order name: LFT's; Complete Time: 17:01                                                 snw 

                                                                                             

15:45 Order name: Magnesium; Complete Time: 17:01                                             snw 

                                                                                             

15:45 Order name: NT PRO-BNP; Complete Time: 17:01                                            snw 

                                                                                             

15:45 Order name: PT-INR; Complete Time: 16:25                                                snw 

                                                                                             

15:45 Order name: Troponin HS; Complete Time: 17:01                                           snw 

                                                                                             

15:45 Order name: XRAY Chest (1 view); Complete Time: 16:16                                   snw 

                                                                                             

15:45 Order name: EKG; Complete Time: 15:46                                                   snw 

                                                                                             

15:45 Order name: Cardiac monitoring; Complete Time: 16:18                                    snw 

                                                                                             

15:45 Order name: EKG - Nurse/Tech; Complete Time: 16:18                                      snw 

                                                                                             

19:08 Order name: EKG; Complete Time: 19:09                                                   snw 

                                                                                             

19:08 Order name: Troponin High Sensitivity; Complete Time: 20:20                             snw 

                                                                                             

15:45 Order name: IV Saline Lock; Complete Time: 16:18                                        snw 

                                                                                             

15:45 Order name: Labs collected and sent; Complete Time: 16:18                               snw 

                                                                                             

15:45 Order name: O2 Per Protocol; Complete Time: 15:53                                       snw 

                                                                                             

15:45 Order name: O2 Sat Monitoring; Complete Time: 15:53                                     snw 

                                                                                             

19:08 Order name: EKG - Nurse/Tech; Complete Time: 19:58                                      snw 

                                                                                             

20:13 Order name: PO challenge; Complete Time: 20:32                                          snw 

                                                                                                  

EC:25 Rate is 161 beats/min. Rhythm is regular. QRS Axis is Normal. VA interval is normal.    snw 

      QRS interval is normal. QT interval is normal. No Q waves. Clinical impression: NSR w/      

      Non-specific ST/T Changes.                                                                  

                                                                                                  

Administered Medications:                                                                         

16:02 Drug: Aspirin Chewable Tablet 324 mg Route: PO;                                         vg1 

20:36 Follow up: Response: No adverse reaction                                                kd3 

                                                                                                  

                                                                                                  

Disposition Summary:                                                                              

22 20:20                                                                                    

Discharge Ordered                                                                                 

      Location: Home                                                                          snw 

      Condition: Stable                                                                       snw 

      Diagnosis                                                                                   

        - Chest pain, unspecified                                                             snw 

      Followup:                                                                               snw 

        - With: Emergency Department                                                               

        - When: As needed                                                                          

        - Reason: Worsening of condition                                                           

      Followup:                                                                               snw 

        - With: Private Physician                                                                  

        - When: 2 - 3 days                                                                         

        - Reason: Recheck today's complaints, Continuance of care, Re-evaluation by your           

      physician                                                                                   

      Discharge Instructions:                                                                     

        - Discharge Summary Sheet                                                             snw 

        - Nonspecific Chest Pain, Adult                                                       snw 

        - Chest Wall Pain                                                                     snw 

        - Gastroesophageal Reflux Disease, Adult                                              snw 

        - Hypertension, Adult                                                                 snw 

        - How to Take Your Blood Pressure, Easy-to-Read                                       snw 

        - Aspirin and Your Heart                                                              snw 

        - Form - Blood Pressure Record Sheet                                                  snw 

      Forms:                                                                                      

        - Medication Reconciliation Form                                                      snw 

        - Thank You Letter                                                                    snw 

        - Antibiotic Education                                                                snw 

        - Prescription Opioid Use                                                             snw 

      Prescriptions:                                                                              

        - Pepcid 20 mg Oral Tablet                                                                 

            - take 1 tablet by ORAL route once daily; 20 tablet; Refills: 0, Product          snw 

      Selection Permitted                                                                         

Signatures:                                                                                       

Dispatcher [employer]                           Gilma Godoy FNP-C                   FNP-Elizabethw                                                  

Annemarie Whaley, RN                    RN   vg1                                                  

Kwaku, Leeanne, RN                       RN   jh5                                                  

Gavin, Kylee                          RN   kd3                                                  

                                                                                                  

**************************************************************************************************
Principal Discharge DX:	Cyndi

## 2022-12-07 VITALS — DIASTOLIC BLOOD PRESSURE: 88 MMHG | SYSTOLIC BLOOD PRESSURE: 125 MMHG | OXYGEN SATURATION: 100 %

## 2022-12-07 VITALS — TEMPERATURE: 98.6 F

## 2022-12-08 NOTE — EKG
Test Date:    2022-12-06               Test Time:    16:07:14

Technician:                                          

                                                     

MEASUREMENT RESULTS:                                       

Intervals:                                           

Rate:         63                                     

IL:           158                                    

QRSD:         80                                     

QT:           386                                    

QTc:          395                                    

Axis:                                                

P:            47                                     

IL:           158                                    

QRS:          15                                     

T:            45                                     

                                                     

INTERPRETIVE STATEMENTS:                                       

                                                     

Normal sinus rhythm

Normal ECG

Compared to ECG 03/20/2017 16:44:20

No significant changes



Electronically Signed On 12-08-22 05:44:52 CST by Zachary Boyd

## 2022-12-08 NOTE — EKG
Test Date:    2022-12-06               Test Time:    19:55:18

Technician:   MAKENZIE                                     

                                                     

MEASUREMENT RESULTS:                                       

Intervals:                                           

Rate:         56                                     

MS:           166                                    

QRSD:         78                                     

QT:           440                                    

QTc:          424                                    

Axis:                                                

P:            47                                     

MS:           166                                    

QRS:          19                                     

T:            32                                     

                                                     

INTERPRETIVE STATEMENTS:                                       

                                                     

Sinus bradycardia

Cannot rule out Anterior infarct, age undetermined

Abnormal ECG

Compared to ECG 03/20/2017 16:44:20

Myocardial infarct finding now present

Sinus rhythm no longer present



Electronically Signed On 12-08-22 05:44:49 CST by Zachary Boyd

## 2024-10-07 ENCOUNTER — HOSPITAL ENCOUNTER (OUTPATIENT)
Dept: HOSPITAL 97 - ER | Age: 44
Setting detail: OBSERVATION
LOS: 1 days | Discharge: HOME | End: 2024-10-08
Attending: HOSPITALIST | Admitting: INTERNAL MEDICINE
Payer: SELF-PAY

## 2024-10-07 VITALS — BODY MASS INDEX: 30.2 KG/M2

## 2024-10-07 DIAGNOSIS — R19.7: ICD-10-CM

## 2024-10-07 DIAGNOSIS — R00.0: ICD-10-CM

## 2024-10-07 DIAGNOSIS — R11.2: Primary | ICD-10-CM

## 2024-10-07 DIAGNOSIS — E78.00: ICD-10-CM

## 2024-10-07 LAB
ALBUMIN SERPL BCP-MCNC: 4.3 G/DL (ref 3.4–5)
ALBUMIN/GLOB SERPL: 0.9 {RATIO} (ref 1.1–1.8)
ALP SERPL-CCNC: 144 U/L (ref 45–117)
ALT SERPL W P-5'-P-CCNC: 94 U/L (ref 13–56)
ANION GAP SERPL CALC-SCNC: 10.6 MEQ/L (ref 5–15)
APTT BLD: 34.3 SECONDS (ref 24.3–36.9)
AST SERPL W P-5'-P-CCNC: 45 U/L (ref 15–37)
BLD SMEAR INTERP: (no result)
BUN BLD-MCNC: 11 MG/DL (ref 7–18)
GLOBULIN SER CALC-MCNC: 4.6 G/DL (ref 2.3–3.5)
GLUCOSE SERPLBLD-MCNC: 115 MG/DL (ref 74–106)
HCG SERPL-ACNC: < 1 MIU/ML (ref 1–3)
HCT VFR BLD CALC: 42.9 % (ref 36–45)
HGB BLD-MCNC: 14.1 G/DL (ref 12–15)
INR BLD: 1.14
LYMPHOCYTES # SPEC AUTO: 0.6 K/UL (ref 0.7–4.9)
MAGNESIUM SERPL-MCNC: 1.7 MG/DL (ref 1.6–2.4)
MCH RBC QN AUTO: 29.1 PG (ref 27–35)
MCHC RBC AUTO-ENTMCNC: 32.9 G/DL (ref 32–36)
MCV RBC: 88.6 FL (ref 80–100)
MORPHOLOGY BLD-IMP: (no result)
NRBC # BLD: 0 10*3/UL (ref 0–0)
NRBC BLD AUTO-RTO: 0 % (ref 0–0)
PMV BLD: 8.3 FL (ref 7.6–11.3)
POTASSIUM SERPL-SCNC: 3.6 MEQ/L (ref 3.5–5.1)
PROTHROMBIN TIME: 12.7 SECONDS (ref 9.4–12.5)
RBC # BLD: 4.84 M/UL (ref 3.86–4.86)
SQUAMOUS URNS QL MICRO: <5 /HPF
TSH SERPL DL<=0.05 MIU/L-ACNC: < 0.005 UIU/ML (ref 0.36–3.74)
UA COMPLETE W REFLEX CULTURE PNL UR: (no result)
UA DIPSTICK W REFLEX MICRO PNL UR: (no result)
WBC # BLD AUTO: 10.7 THOU/UL (ref 4.3–10.9)

## 2024-10-07 PROCEDURE — 93005 ELECTROCARDIOGRAM TRACING: CPT

## 2024-10-07 PROCEDURE — 80053 COMPREHEN METABOLIC PANEL: CPT

## 2024-10-07 PROCEDURE — 85025 COMPLETE CBC W/AUTO DIFF WBC: CPT

## 2024-10-07 PROCEDURE — 83605 ASSAY OF LACTIC ACID: CPT

## 2024-10-07 PROCEDURE — 96361 HYDRATE IV INFUSION ADD-ON: CPT

## 2024-10-07 PROCEDURE — 96374 THER/PROPH/DIAG INJ IV PUSH: CPT

## 2024-10-07 PROCEDURE — 84443 ASSAY THYROID STIM HORMONE: CPT

## 2024-10-07 PROCEDURE — 81001 URINALYSIS AUTO W/SCOPE: CPT

## 2024-10-07 PROCEDURE — 96375 TX/PRO/DX INJ NEW DRUG ADDON: CPT

## 2024-10-07 PROCEDURE — 86376 MICROSOMAL ANTIBODY EACH: CPT

## 2024-10-07 PROCEDURE — 36415 COLL VENOUS BLD VENIPUNCTURE: CPT

## 2024-10-07 PROCEDURE — 93306 TTE W/DOPPLER COMPLETE: CPT

## 2024-10-07 PROCEDURE — 83735 ASSAY OF MAGNESIUM: CPT

## 2024-10-07 PROCEDURE — 74177 CT ABD & PELVIS W/CONTRAST: CPT

## 2024-10-07 PROCEDURE — 76536 US EXAM OF HEAD AND NECK: CPT

## 2024-10-07 PROCEDURE — 84439 ASSAY OF FREE THYROXINE: CPT

## 2024-10-07 PROCEDURE — 86800 THYROGLOBULIN ANTIBODY: CPT

## 2024-10-07 PROCEDURE — 84702 CHORIONIC GONADOTROPIN TEST: CPT

## 2024-10-07 PROCEDURE — 99285 EMERGENCY DEPT VISIT HI MDM: CPT

## 2024-10-07 PROCEDURE — 85610 PROTHROMBIN TIME: CPT

## 2024-10-07 PROCEDURE — 87040 BLOOD CULTURE FOR BACTERIA: CPT

## 2024-10-07 PROCEDURE — 85730 THROMBOPLASTIN TIME PARTIAL: CPT

## 2024-10-07 NOTE — EDPHYS
Physician Documentation                                                                           

 [facility]                                                                 

Name: Mary Cantu                                                                                  

Age: 44 yrs                                                                                       

Sex: Female                                                                                       

: 1980                                                                                   

MRN: [id number]                                                                                   

Arrival Date: 10/07/2024                                                                          

Time: 14:36                                                                                       

Account#: [id number]                                                                            

Bed 13                                                                                            

Private MD:                                                                                       

ED Physician Donny Sawyer                                                                     

HPI:                                                                                              

10/07                                                                                             

15:21 This 44 yrs old  Female presents to ER via Ambulatory with complaints of        sb4 

      Vomiting/Diarrhea.                                                                          

15:21 The patient presents to the emergency department with nausea, vomiting, diarrhea,       sb4 

      abdominal pain. Onset: The symptoms/episode began/occurred this morning. Possible           

      causes: bad food exposure, krysten in the box. The symptoms are aggravated by food , The       

      symptoms are alleviated by nothing. Associated signs and symptoms: The patient has no       

      apparent associated signs or symptoms. The patient has not experienced similar symptoms     

      in the past. The patient has not recently seen a physician.                                 

                                                                                                  

OB/GYN:                                                                                           

15:16 LMP 9/15/2024, Pregnancy unknown                                                        cm10

                                                                                                  

Historical:                                                                                       

- Allergies:                                                                                      

15:16 Motrin;                                                                                 cm10

- PMHx:                                                                                           

15:16 High Cholesterol;                                                                       cm10

- PSHx:                                                                                           

15:16 tubal ;                                                                             cm10

                                                                                                  

- Immunization history:: Adult Immunizations up to date.                                          

- Infectious Disease History:: Denies.                                                            

- Social history:: Smoking status: Patient denies any tobacco usage or history of.                

                                                                                                  

                                                                                                  

ROS:                                                                                              

15:21 Constitutional: Negative for fever, chills, and weight loss,                            sb4 

15:21 Abdomen/GI: Positive for abdominal pain, nausea, vomiting, and diarrhea,                    

15:21 All other systems are negative,                                                             

                                                                                                  

Exam:                                                                                             

15:21 Constitutional:  This is a well developed, well nourished patient who is awake, alert,  sb4 

      and in no acute distress. Head/Face:  Normocephalic, atraumatic. Eyes:  Extra-ocular        

      motions intact.  Periorbital areas with no swelling, redness, or edema. Respiratory:        

      Lungs have equal breath sounds bilaterally, clear to auscultation and percussion.  No       

      rales, rhonchi or wheezes noted.  No increased work of breathing, no retractions or         

      nasal flaring. Abdomen/GI:  Soft, non-tender, no distension. Skin:  Warm, dry with          

      normal turgor.  Normal color with no rashes, no lesions, and no evidence of cellulitis.     

15:21 Cardiovascular: Rate: tachycardic, Rhythm: regular,                                         

21:04 ECG was reviewed by the Attending Physician.                                            rt  

                                                                                                  

Vital Signs:                                                                                      

15:15  / 95; Pulse 131; Resp 18; Temp 99.9; Pulse Ox 99% ; Weight 63.5 kg; Height 4 ft. cm10

      9 in. ; Pain 8/10;                                                                          

16:39  / 79; Pulse 118; Resp 18; Pulse Ox 98% on R/A;                                   db  

18:00  / 81; Pulse 116; Resp 18; Pulse Ox 100% ;                                        db  

20:26  / 59; Pulse 120; Resp 18; Temp 98; Pulse Ox 100% on R/A;                         kj2 

20:45  / 66; Pulse 139; Resp 18;                                                        kj2 

21:20  / 74 Sitting; Pulse 148; Resp 20; Temp 99.7; Pulse Ox 100% on R/A;               kj2 

22:13  / 60; Pulse 117; Resp 18; Pulse Ox 100% on R/A;                                  kj2 

23:50  / 67; Pulse 108; Resp 18; Temp 98.8(O); Pulse Ox 98% on R/A;                     kj2 

15:15 Body Mass Index 30.30 (63.50 kg, 144.78 cm)                                             cm10

15:15 Pain Scale: Adult                                                                       cm10

                                                                                                  

MDM:                                                                                              

14:43 Patient medically screened.                                                             sb4 

20:04 Data reviewed: vital signs, nurses notes, lab test result(s), radiologic studies, and   sb4 

      as a result, I will discharge patient. Consideration of Admission/Observation               

      Escalation of care including admission/observation considered. Counseling: I had a          

      detailed discussion with the patient and/or guardian regarding the historical points,       

      exam findings, and any diagnostic results supporting the discharge/admit diagnosis, lab     

      results, radiology results, the need for outpatient follow up, for definitive care, to      

      return to the emergency department if symptoms worsen or persist or if there are any        

      questions or concerns that arise at home. ED course: Patient states that her nausea and     

      abdominal pain have resolved but her diarrhea persists. She is tolerating crackers and      

      water. Her workup was negative for any bacterial infection, I suspect the elevation in      

      lactate to dehydration or possible viral gastroenteritis. Her repeat lactate was within     

      normal limits. I do not believe that antibiotics are indicated at this time. Will           

      discharge patient home with antiemetics and instructions to rehydrate.                      

                                                                                                  

10/07                                                                                             

15:20 Order name: Blood Culture Adult (2)                                                     sb4 

10/07                                                                                             

15:20 Order name: CBC with Diff; Complete Time: 17:25                                         sb4 

10/07                                                                                             

15:20 Order name: CMP; Complete Time: 17:10                                                   sb4 

10/07                                                                                             

15:20 Order name: Lactate w/ 2H reflex if indic.; Complete Time: 17:13                        sb4 

10/07                                                                                             

15:20 Order name: Protime (+inr); Complete Time: 17:36                                        sb4 

10/07                                                                                             

15:20 Order name: Ptt, Activated; Complete Time: 17:36                                        sb4 

10/07                                                                                             

15:20 Order name: Urinalysis w/ reflexes; Complete Time: 19:47                                sb4 

10/07                                                                                             

17:20 Order name: CBC Smear Scan; Complete Time: 17:25                                        EDMS

10/07                                                                                             

18:23 Order name: Lactate w/ 2H reflex if indic.; Complete Time: 19:56                        sb4 

10/07                                                                                             

19:13 Order name: Ghost Lactate-NO COLLECT Timer; Complete Time: 19:13                        EDMS

10/07                                                                                             

22:05 Order name: TSH                                                                         rt  

10/07                                                                                             

22:05 Order name: Magnesium                                                                   rt  

10/07                                                                                             

22:44 Order name: HCG, Quantitative                                                           EDMS

10/07                                                                                             

23:26 Order name: Urinalysis w/ reflexes                                                      EDMS

10/07                                                                                             

23:26 Order name: CBC with Automated Diff                                                     EDMS

10/07                                                                                             

23:26 Order name: CBC with Automated Diff                                                     EDMS

10/07                                                                                             

23:26 Order name: Comprehensive Metabolic Panel                                               EDMS

10/07                                                                                             

23:26 Order name: Comprehensive Metabolic Panel                                               EDMS

10/07                                                                                             

23:29 Order name: T4 Free                                                                     EDMS

10/07                                                                                             

16:59 Order name: CT Abd/Pelvis - IV Contrast Only; Complete Time: 17:57                      sb4 

10/07                                                                                             

20:46 Order name: EKG; Complete Time: 20:47                                                   rt  

10/07                                                                                             

15:20 Order name: Cardiac monitoring; Complete Time: 19:54                                    sb4 

10/07                                                                                             

15:20 Order name: IV Saline Lock - Large Bore; Complete Time: 16:48                           sb4 

10/07                                                                                             

15:20 Order name: Labs collected and sent; Complete Time: 16:48                               sb4 

10/07                                                                                             

15:20 Order name: O2 Per Protocol; Complete Time: 16:48                                       sb4 

10/07                                                                                             

15:20 Order name: O2 Sat Monitoring; Complete Time: 16:48                                     sb4 

10/07                                                                                             

15:20 Order name: Vital Signs; Complete Time: 16:48                                           sb4 

10/07                                                                                             

18:23 Order name: PO challenge; Complete Time: 19:05                                          sb4 

10/07                                                                                             

20:46 Order name: EKG - Nurse/Tech; Complete Time: 21:01                                      rt  

10/07                                                                                             

20:46 Order name: Vital Signs; Complete Time: 20:52                                           rt  

                                                                                                  

EC:03 Rate is 129 beats/min. Rhythm is regular, Sinus tachycardia. MI interval is normal at   sb4 

      158 msec. QRS interval is normal at 68 msec. QT interval is normal at 280 msec. No Q        

      waves. T waves are Normal. No ST changes noted. Clinical impression: Sinus tachycardia      

      and No evidence of ischemia. Interpreted by me. Reviewed by me.                             

21:04 Rate is 133 beats/min. Rhythm is regular, Sinus tachycardia with No ectopy. QRS Axis is rt  

      Normal. MI interval is normal. QRS interval is normal. QT interval is normal. No Q          

      waves. T waves are Normal. No ST changes noted. Interpreted by me.                          

                                                                                                  

Administered Medications:                                                                         

16:40 Drug: NS 0.9% IV (30 ml/kg) 30 ml/kg IV at bolus once; Sepsis Protocol Route: IV; Rate: db  

      bolus; Site: right antecubital;                                                             

16:40 Drug: Ondansetron IVP 4 mg IVP once; over 2 minutes Route: IVP; Site: right antecubital;db  

19:11 Follow up: Response: No adverse reaction                                                kj2 

20:14 Drug: Loperamide PO 4 mg PO once Route: PO;                                             kj2 

20:27 Follow up: Response: No adverse reaction                                                kj2 

21:10 Drug: metoCLOPramide IVP 10 mg IVP once; over 1 to 2 minutes Route: IVP; Site: right    kj2 

      antecubital;                                                                                

22:29 Follow up: Response: No adverse reaction                                                kj2 

21:19 Drug: diphenhydrAMINE IVP 25 mg IVP once Route: IVP; Site: right antecubital;           kj2 

22:28 Follow up: Response: No adverse reaction                                                kj2 

21:19 Drug: Acetaminophen PO 1000 mg PO once Route: PO;                                       kj2 

22:29 Follow up: Response: No adverse reaction                                                kj2 

21:19 Drug: NS 0.9%  ml IV at bolus once Route: IV; Rate: bolus; Site: right            kj2 

      antecubital;                                                                                

22:20 Follow up: IV Status: Completed infusion; IV Intake: 500ml                              kj2 

                                                                                                  

                                                                                                  

Disposition:                                                                                      

22:12 Co-signature as Attending Physician, Donny Sawyer MD I reviewed the patient's care   rt  

      provided by Advanced Practice Provider \T\ agree w/ the diagnosis \T\ care plan. I          

      personally saw the pt \T\ performed a substantive portion of the visit, incldng all         

      aspects of the (History/Exam/Medical Decision Making). I personally evaluated the           

      patient, patient continues to have sinus tachycardia despite IV fluids. Workup is           

      largely benign. Patient continues having a headache, nausea which did improve with          

      Reglan. Given persistent tachycardia despite aggressive hydration, will admit patient       

      for further management..                                                                    

                                                                                                  

Disposition Summary:                                                                              

10/07/24 22:11                                                                                    

Hospitalization Ordered                                                                           

 Notes:       Hospitalization Status: Observation                                                   
  rt

      Provider: Ollie Cuellar                                                               rt  

      Location: Telemetry/MedSurg (observation)(10/07/24 22:11)                               rt  

      Condition: Stable(10/07/24 22:11)                                                       rt  

      Problem: new(10/07/24 22:11)                                                            rt  

      Symptoms: have improved(10/07/24 22:11)                                                 rt  

      Bed/Room Type: Standard                                                                 rt  

      Room Assignment: 407(10/07/24 23:46)                                                    rv1 

      Diagnosis                                                                                   

        - Nausea and vomiting                                                                 rt  

        - Diarrhea                                                                            rt  

        - Sinus tachycardia                                                                   rt  

      Forms:                                                                                      

        - Medication Reconciliation Form                                                      rt  

        - SBAR form                                                                           rt  

        - Leadership Thank You Letter                                                         rt  

Signatures:                                                                                       

Dispatcher [employer]                           EDMarissa Coleman, RN                    RN   Katelynn Maria, PA-C                     PA-C sb4                                                  

Donny Sawyer MD MD   rt                                                   

Lori Bernstein                            rv1                                                  

Yaneth Douglas RN                  RN   cm10                                                 

Gloria Napier RN                     RN   kj2                                                  

                                                                                                  

Corrections: (The following items were deleted from the chart)                                    

20:45 20:10 Home sb4                                                                          rt  

20:45 20:10 new sb4                                                                           rt  

20:45 20:10 have improved sb4                                                                 rt  

20:45 20:10 Stable sb4                                                                        rt  

20:45 20:10 Viral gastroenteritis sb4                                                         rt  

22:44 22:33 HCG, Quantitative ordered. EDMS                                                   EDMS

23:27 23:25 T4 FREE+C.LAB.BRZ ordered. EDMS                                                   EDMS

23:46 22:11 rt                                                                                rv1 

                                                                                                  

**************************************************************************************************

## 2024-10-07 NOTE — RAD REPORT
EXAMINATION: CT ABDOMEN AND PELVIS WITH CONTRAST



CLINICAL INDICATION: Female, 44 years old.ABD PAIN



TECHNIQUE: CT abdomen and pelvis was performed, after the administration of IV contrast, as per depar
[facility]. Axial, sagittal and coronal reconstructions were obtained. One or more of the

following dose reduction techniques were used: Automated exposure control, adjustment of the mA and/o
r kV according to patient size, and/or iterative reconstruction. Unless otherwise specified,

incidental findings do not require dedicated imaging follow-up. [id number].

  

COMPARISON: No prior exam. 



FINDINGS: 

LOWER CHEST: The visualized lung bases are clear.

LIVER: Normal in size and contour. No focal lesion.

GALLBLADDER/BILE DUCT: No biliary ductal dilatation.?

PANCREAS: No significant abnormality.

SPLEEN: Normal size.  No focal lesion.

ADRENALS: Normal; no mass.

KIDNEYS AND URETERS: Normal size and contour. No hydronephrosis. Bilateral renal lesions which are ei
ther benign in appearance or too small to accurately characterize but statistically benign.

GASTROINTESTINAL TRACT: Stomach is non-dilated. Small bowel has normal course and caliber. No colonic
 wall thickening or pericolonic inflammatory changes. Normal appendix.

PERITONEUM: No ascites.

LYMPH NODES: No lymphadenopathy.

ABDOMINAL AORTA AND OTHER VESSELS: Normal caliber aorta and IVC.

URINARY BLADDER: Normal contour.

REPRODUCTIVE ORGANS: No pathologic process

MUSCULOSKELETAL: No acute or suspicious osseous abnormality.



ADDITIONAL FINDINGS: None.



IMPRESSION: 

No acute or significant abnormalities seen in the abdomen or pelvis.



Reported By: Ez Rangel 

Electronically Signed:  10/7/2024 5:47 PM

## 2024-10-07 NOTE — ER
Nurse's Notes                                                                                     

 [facility]                                                                 

Name: Mary Cantu                                                                                  

Age: 44 yrs                                                                                       

Sex: Female                                                                                       

: 1980                                                                                   

MRN: [id number]                                                                                   

Arrival Date: 10/07/2024                                                                          

Time: 14:36                                                                                       

Account#: [id number]                                                                            

Bed 13                                                                                            

Private MD:                                                                                       

Diagnosis: Nausea and vomiting;Diarrhea;Sinus tachycardia                                         

                                                                                                  

Presentation:                                                                                     

10/07                                                                                             

15:15 Chief complaint: Patient states: Epigastric pain, nausea vomiting, diarrhea, and        cm10

      headache onset today. Coronavirus screen: Client denies travel out of the [place] in the       

      last 14 days. Ebola Screen: Patient denies travel to an Ebola-affected area in the 21       

      days before illness onset. Initial Sepsis Screen: Does the patient meet any 2 criteria?     

      HR > 90 bpm. Does the patient have a suspected source of infection? No. Patient's           

      initial sepsis screen is negative. Risk Assessment: Do you want to hurt yourself or         

      someone else? Patient reports no desire to harm self or others. Onset of symptoms was       

      2024.                                                                           

15:15 Method Of Arrival: Ambulatory                                                           cm10

15:15 Acuity: GASPER 3                                                                           cm10

                                                                                                  

Triage Assessment:                                                                                

15:16 General: Appears in no apparent distress. comfortable, Behavior is calm, cooperative.   cm10

      Neuro: No deficits noted. Level of Consciousness is awake, alert, obeys commands,           

      Oriented to person, place, time, situation, Appropriate for age. Cardiovascular:            

      Patient's skin is warm and dry. Respiratory: No deficits noted. Airway is patent            

      Respiratory effort is even, unlabored, Respiratory pattern is regular, symmetrical. GI:     

      Reports diarrhea, epigastric pain, nausea, vomiting. Musculoskeletal: No deficits           

      noted. Range of motion: intact in all extremities.                                          

                                                                                                  

OB/GYN:                                                                                           

15:16 LMP 9/15/2024, Pregnancy unknown                                                        cm10

                                                                                                  

Historical:                                                                                       

- Allergies:                                                                                      

15:16 Motrin;                                                                                 cm10

- PMHx:                                                                                           

15:16 High Cholesterol;                                                                       cm10

- PSHx:                                                                                           

15:16 tubal 2003;                                                                             cm10

                                                                                                  

- Immunization history:: Adult Immunizations up to date.                                          

- Infectious Disease History:: Denies.                                                            

- Social history:: Smoking status: Patient denies any tobacco usage or history of.                

                                                                                                  

                                                                                                  

Screenin:56 [facility] ED Fall Risk Assessment (Adult) History of falling in the last 3 months,       db  

      including since admission No falls in past 3 months (0 pts) Confusion or Disorientation     

      No (0 pts) Intoxicated or Sedated No (0 pts) Impaired Gait No (0 pts) Mobility Assist       

      Device Used No (0 pt) Altered Elimination No (0 pt) Score/Fall Risk Level 0 - 2 = Low       

      Risk Oriented to surroundings, Maintained a safe environment. Abuse screen: Denies          

      threats or abuse. Denies injuries from another. Nutritional screening: No deficits          

      noted. Tuberculosis screening: No symptoms or risk factors identified.                      

                                                                                                  

Assessment:                                                                                       

16:48 Reassessment: Patient appears in no apparent distress at this time. Patient and/or      db  

      family updated on plan of care and expected duration. Pain level reassessed. Patient is     

      alert, oriented x 3, equal unlabored respirations, skin warm/dry/pink. General: Appears     

      in no apparent distress. comfortable, Behavior is calm, cooperative. Pain: Denies pain.     

      Neuro: Level of Consciousness is awake, alert, obeys commands, Oriented to person,          

      place, time, situation. GI: Abdomen is flat, non-distended, Reports nausea, vomiting.       

17:37 Reassessment: NOTIFIED PT OF NEED FOR ANOTHER URINE SPECIMEN.                           db  

17:57 Reassessment: Patient appears in no apparent distress at this time. Patient and/or      db  

      family updated on plan of care and expected duration. Pain level reassessed. Patient is     

      alert, oriented x 3, equal unlabored respirations, skin warm/dry/pink. Patient states       

      feeling better.                                                                             

18:30 Reassessment: Patient appears in no apparent distress at this time. Patient and/or      db  

      family updated on plan of care and expected duration. Pain level reassessed. Patient is     

      alert, oriented x 3, equal unlabored respirations, skin warm/dry/pink. PATIENT PROVIDED     

      WATER FOR PO CHALLENGE.                                                                     

19:37 Reassessment: Patient appears in no apparent distress at this time. Patient and/or      kj2 

      family updated on plan of care and expected duration. Pain level reassessed. Patient is     

      alert, oriented x 3, equal unlabored respirations, skin warm/dry/pink.                      

20:45 Reassessment: DISCHARGE PENDING DUE TO ELEVATED HEART RATE.                             kj2 

21:20 Reassessment: Patient appears in no apparent distress at this time. Patient and/or      kj2 

      family updated on plan of care and expected duration. Pain level reassessed. Patient is     

      alert, oriented x 3, equal unlabored respirations, skin warm/dry/pink.                      

22:49 Reassessment: Patient appears in no apparent distress at this time. Patient and/or      kj2 

      family updated on plan of care and expected duration. Pain level reassessed. Patient is     

      alert, oriented x 3, equal unlabored respirations, skin warm/dry/pink.                      

23:50 Reassessment: Patient appears in no apparent distress at this time. Patient and/or      kj2 

      family updated on plan of care and expected duration. Pain level reassessed. Patient is     

      alert, oriented x 3, equal unlabored respirations, skin warm/dry/pink.                      

10/08                                                                                             

00:07 Reassessment: NO ANSWER TO PHONE OR 4TH FLOOR EXT FOR REPORT ON 1ST ATTEMPT.            kj2 

                                                                                                  

Vital Signs:                                                                                      

10/07                                                                                             

15:15  / 95; Pulse 131; Resp 18; Temp 99.9; Pulse Ox 99% ; Weight 63.5 kg; Height 4 ft. cm10

      9 in. ; Pain 8/10;                                                                          

16:39  / 79; Pulse 118; Resp 18; Pulse Ox 98% on R/A;                                   db  

18:00  / 81; Pulse 116; Resp 18; Pulse Ox 100% ;                                        db  

20:26  / 59; Pulse 120; Resp 18; Temp 98; Pulse Ox 100% on R/A;                         kj2 

20:45  / 66; Pulse 139; Resp 18;                                                        kj2 

21:20  / 74 Sitting; Pulse 148; Resp 20; Temp 99.7; Pulse Ox 100% on R/A;               kj2 

22:13  / 60; Pulse 117; Resp 18; Pulse Ox 100% on R/A;                                  kj2 

23:50  / 67; Pulse 108; Resp 18; Temp 98.8(O); Pulse Ox 98% on R/A;                     kj2 

15:15 Body Mass Index 30.30 (63.50 kg, 144.78 cm)                                             cm10

15:15 Pain Scale: Adult                                                                       cm10

                                                                                                  

ED Course:                                                                                        

14:40 Patient arrived in ED.                                                                  mg5 

14:41 Katelynn Chatterjee PA-C is PHCP.                                                            sb4 

14:41 Codie Gonzalez MD is Attending Physician.                                                sb4 

15:16 Triage completed.                                                                       cm10

15:17 Arm band placed on left wrist. Patient placed in waiting room.                          cm10

15:56 Marissa Pedraza, RN is Primary Nurse.                                                  db  

16:30 Inserted saline lock: 20 gauge in right antecubital area, using aseptic technique.      db  

      Blood collected. Flushed with 10 mL NS.                                                     

17:40 CT Abd/Pelvis - IV Contrast Only In Process Unspecified.                                EDMS

17:57 Patient has correct armband on for positive identification. Bed in low position. Call   db  

      light in reach. Side rails up X 1. Cardiac monitor on. Pulse ox on. NIBP on. Warm           

      blanket given. Pillow given.                                                                

18:53 Door closed. Noise minimized. Lights dimmed. Warm blanket given.                        kc6 

19:11 Gloria Napier, RN is Primary Nurse.                                                   kj2 

19:15 Provided Education on: CALL LIGHT.                                                      kj2 

19:55 EKG done, by ED staff.                                                                  vk  

20:24 No provider procedures requiring assistance completed.                                  kj2 

20:42 Attending Physician role handed off by Codie Gonzalez MD                                 rt  

20:42 Donny Sawyer MD is Attending Physician.                                            rt  

22:11 Ollie Cuellar MD is Hospitalizing Provider.                                          rt  

10/08                                                                                             

00:59 Patient admitted, IV remains in place.                                                  kj2 

                                                                                                  

Administered Medications:                                                                         

10/07                                                                                             

16:40 Drug: NS 0.9% IV (30 ml/kg) 30 ml/kg IV at bolus once; Sepsis Protocol Route: IV; Rate: db  

      bolus; Site: right antecubital;                                                             

16:40 Drug: Ondansetron IVP 4 mg IVP once; over 2 minutes Route: IVP; Site: right antecubital;db  

19:11 Follow up: Response: No adverse reaction                                                kj2 

20:14 Drug: Loperamide PO 4 mg PO once Route: PO;                                             kj2 

20:27 Follow up: Response: No adverse reaction                                                kj2 

21:10 Drug: metoCLOPramide IVP 10 mg IVP once; over 1 to 2 minutes Route: IVP; Site: right    kj2 

      antecubital;                                                                                

22:29 Follow up: Response: No adverse reaction                                                kj2 

21:19 Drug: diphenhydrAMINE IVP 25 mg IVP once Route: IVP; Site: right antecubital;           kj2 

22:28 Follow up: Response: No adverse reaction                                                kj2 

21:19 Drug: Acetaminophen PO 1000 mg PO once Route: PO;                                       kj2 

22:29 Follow up: Response: No adverse reaction                                                kj2 

21:19 Drug: NS 0.9%  ml IV at bolus once Route: IV; Rate: bolus; Site: right            kj2 

      antecubital;                                                                                

22:20 Follow up: IV Status: Completed infusion; IV Intake: 500ml                              kj2 

                                                                                                  

                                                                                                  

Medication:                                                                                       

19:15 VIS not applicable for this client.                                                     kj2 

                                                                                                  

Intake:                                                                                           

22:20 IV: 500ml; Total: 500ml.                                                                kj2 

                                                                                                  

Outcome:                                                                                          

20:10 Discharge ordered by MD.                                                                sb4 

20:25 Condition: stable                                                                       kj2 

20:25 Discharged to home ambulatory, with family,                                             kj2 

20:25 Discharge instructions given to patient, family, Instructed on discharge instructions,      

      follow up and referral plans. medication usage, Demonstrated understanding of               

      instructions, follow-up care, medications, Prescriptions given X 1,                         

22:11 Decision to Hospitalize by Provider.                                                    rt  

10/08                                                                                             

00:59 Patient left the ED.                                                                    kj2 

                                                                                                  

Signatures:                                                                                       

Dispatcher [employer]                           EDMS                                                 

Britt Da Silva, RN                   RN   kc6                                                  

Marissa Pedraza, RN                    RN   Katelynn Maria, PA-C                     PA-C sb4                                                  

Donny Sawyer MD MD   rt                                                   

Yaneth Douglas RN                  RN   10                                                 

Elvi George                             mg5                                                  

Karen Pichardo Krystal, PARMJIT                     RN   kj2                                                  

                                                                                                  

Corrections: (The following items were deleted from the chart)                                    

10/07                                                                                             

20:55 20:25 IV discontinued, intact, bleeding controlled, No redness/swelling at site.        kj2 

      Pressure dressing applied, kj2                                                              

                                                                                                  

**************************************************************************************************

## 2024-10-07 NOTE — P.HP
Certification for Inpatient


Patient admitted to: Observation


With expected LOS: <2 Midnights


Patient will require the following post-hospital care: None


Practitioner: I am a practitioner with admitting privileges, knowledge of 

patient current condition, hospital course, and medical plan of care.


Services: Services provided to patient in accordance with Admission requirements

found in Title 42 Section 412.3 of the Code of Federal Regulations





Patient History


Date of Service: 10/08/24


Reason for admission: nausea vomiting diarrhea


History of Present Illness: 





44-year-old female presented with complaints of nausea vomiting diarrhea.  Onset

this morning.  Concerned that she may have had food poisoning.  In the ER she 

has noted to be tachycardic.  She did receive IV fluids.  Feeling better but 

noted to still have elevated heart rate. denies chest pain, dizziness, fevers, 

cough, or chills. 





ED treatment


NS 0.9% 30 ml/kg


Ondasteron 4mg IV, Loperamide 4mg PO, Metoclopramide 10mg, acetaminophen 1000 mg

p.o. once, diphenhydramine 25 mg IV once


 cc bolus





Allergies





ibuprofen [From Motrin] Allergy (Unverified 03/15/17 01:07)


   Unknown








Review of Systems


10-point ROS is otherwise unremarkable


General: Weakness, Malaise


Cardiovascular: Palpitations


Gastrointestinal: Nausea, Vomiting, Diarrhea





Physical Examination





- Physical Exam


General: Alert, In no apparent distress, Oriented x3


HEENT: Atraumatic, Normocephalic


Respiratory: Clear to auscultation bilaterally, Normal air movement


Cardiovascular: No edema, Normal pulses


Gastrointestinal: Normal bowel sounds, No tenderness, No masses, No rebound, No 

guarding


Musculoskeletal: No swelling


Integumentary: No rashes, No breakdown


Neurological: Normal speech





- Studies


Laboratory Data (last 24 hrs)











  10/07/24 10/07/24 10/07/24





  16:36 16:36 16:36


 


WBC    10.70


 


Hgb    14.1


 


Hct    42.9


 


Plt Count    409 H


 


PT  12.7 H  


 


INR  1.14  


 


APTT  34.3  


 


Sodium   136 


 


Potassium   3.6 


 


BUN   11 


 


Creatinine   0.69 


 


Glucose   115 H 


 


Total Bilirubin   0.4 


 


AST   45 H 


 


ALT   94 H 


 


Alkaline Phosphatase   144 H 











Assessment and Plan





- Problems (Diagnosis)


(1) Tachycardia


Current Visit: Yes   Status: Acute   





(2) Nausea & vomiting


Current Visit: Yes   Status: Acute   





(3) Diarrhea


Current Visit: Yes   Status: Acute   





- Plan


44-year-old female presents to the ER with 1 day history of nausea vomiting 

diarrhea.  Noted to be tachycardic.





Nausea vomiting diarrhea


-- Differentials include gastroenteritis


-- Improved after receiving IV fluids, antiemetics and loperamide


-- Labs are stable


-- Could consider redosing loperamide and antiemetic


-- Add as needed Zofran





Sinus tachycardia


-- She denies drinking energy drinks, illegal substances or recent change in 

medications


-- likely secondary to volume depletion


-- Continue IV fluids


-- Consider adding a beta-blocker pending clinical course








Full code, anticipate discharge tomorrow





- Advance Directives


Does patient have a Living Will: No


Does patient have a Durable POA for Healthcare: No

## 2024-10-08 VITALS — TEMPERATURE: 98.4 F | SYSTOLIC BLOOD PRESSURE: 99 MMHG | DIASTOLIC BLOOD PRESSURE: 64 MMHG

## 2024-10-08 VITALS — OXYGEN SATURATION: 98 %

## 2024-10-08 LAB
ALBUMIN SERPL BCP-MCNC: 2.9 G/DL (ref 3.4–5)
ALBUMIN/GLOB SERPL: 0.9 {RATIO} (ref 1.1–1.8)
ALP SERPL-CCNC: 90 U/L (ref 45–117)
ALT SERPL W P-5'-P-CCNC: 55 U/L (ref 13–56)
ANION GAP SERPL CALC-SCNC: 9.1 MEQ/L (ref 5–15)
AST SERPL W P-5'-P-CCNC: 27 U/L (ref 15–37)
BUN BLD-MCNC: 6 MG/DL (ref 7–18)
GLOBULIN SER CALC-MCNC: 3.3 G/DL (ref 2.3–3.5)
GLUCOSE SERPLBLD-MCNC: 123 MG/DL (ref 74–106)
HCT VFR BLD CALC: 31.1 % (ref 36–45)
HGB BLD-MCNC: 10.4 G/DL (ref 12–15)
LYMPHOCYTES # SPEC AUTO: 1 K/UL (ref 0.7–4.9)
MCH RBC QN AUTO: 29.7 PG (ref 27–35)
MCHC RBC AUTO-ENTMCNC: 33.6 G/DL (ref 32–36)
MCV RBC: 88.5 FL (ref 80–100)
NRBC # BLD: 0 10*3/UL (ref 0–0)
NRBC BLD AUTO-RTO: 0.1 % (ref 0–0)
PMV BLD: 8 FL (ref 7.6–11.3)
POTASSIUM SERPL-SCNC: 3.1 MEQ/L (ref 3.5–5.1)
RBC # BLD: 3.52 M/UL (ref 3.86–4.86)
WBC # BLD AUTO: 3.9 THOU/UL (ref 4.3–10.9)

## 2024-10-08 RX ADMIN — SODIUM CHLORIDE SCH MLS: 0.9 INJECTION, SOLUTION INTRAVENOUS at 01:36

## 2024-10-08 RX ADMIN — INFLUENZA VIRUS VACCINE ONE: 15; 15; 15 SUSPENSION INTRAMUSCULAR at 09:39

## 2024-10-08 RX ADMIN — LOPERAMIDE HYDROCHLORIDE PRN MG: 2 CAPSULE ORAL at 12:14

## 2024-10-08 RX ADMIN — PROPRANOLOL HYDROCHLORIDE SCH MG: 10 TABLET ORAL at 08:31

## 2024-10-08 NOTE — RAD REPORT
EXAM;Thyroid Para Parotid Gland



CLINICAL INDICATION: Hyperthyroidism



COMPARISON: None



FINDINGS: 



Right lobe of thyroid gland measures 3.6 x 1.7 x 1.3 cm. 



Left lobe of thyroid gland measures 3.3 x 1.6 x 1 cm. 



Isthmus unremarkable



Thyroid echotexture mildly inhomogeneous.



No discrete nodule visualized.



IMPRESSION:



Mildly inhomogeneous thyroid echotexture may indicate parenchymal disease.



No discrete thyroid nodule visualized



Reported By: Reji Mesa 

Electronically Signed:  10/8/2024 11:06 AM

## 2024-10-08 NOTE — P.PN
Date of Service: 10/08/24





Subjective


Pt is doing well with no new complaints





Physical Examination





- Vitals


reviewed





- Physical Exam


General: Alert, In no apparent distress, Oriented x3


HEENT: WNL


Respiratory: Clear to auscultation bilaterally, Normal air movement


Cardiovascular: No edema, Normal pulses


Gastrointestinal: Normal bowel sounds, No tenderness, No masses, No rebound, No 

guarding


Musculoskeletal: No swelling


Integumentary: No rashes, No breakdown


Neurological: No focal deficits








Assessment and Plan





- Problems (Diagnosis)


(1) Tachycardia


Current Visit: Yes   Status: Acute   





(2) Nausea & vomiting


Current Visit: Yes   Status: Acute   





(3) Diarrhea


Current Visit: Yes   Status: Acute   





- Plan


44-year-old female presents to the ER with 1 day history of nausea vomiting 

diarrhea.  Noted to be tachycardic.





Nausea vomiting diarrhea


-- Differentials include gastroenteritis


-- Improved after receiving IV fluids, antiemetics and loperamide


-- Labs are stable


-- Could consider redosing loperamide and antiemetic


-- Add as needed Zofran





Sinus tachycardia


-- She denies drinking energy drinks, illegal substances or recent change in 

medications


-- likely secondary to volume depletion


-- Continue IV fluids


-- Consider adding a beta-blocker pending clinical course








Full code, anticipate discharge tomorrow





- Advance Directives


Does patient have a Living Will: No


Does patient have a Durable POA for Healthcare: No

## 2024-10-08 NOTE — P.DS
Admission Date: 10/07/24


Discharge Date: 10/08/24


Disposition: ROUTINE DISCHARGE


Discharge Condition: GOOD


Reason for Admission: nausea vomiting diarrhea


Brief History of Present Illness: 





44-year-old female presented with complaints of nausea vomiting diarrhea.  Onset

this morning.  Concerned that she may have had food poisoning.  In the ER she 

has noted to be tachycardic.  She did receive IV fluids.  Feeling better but 

noted to still have elevated heart rate. denies chest pain, dizziness, fevers, 

cough, or chills. 





ED treatment


NS 0.9% 30 ml/kg


Ondasteron 4mg IV, Loperamide 4mg PO, Metoclopramide 10mg, acetaminophen 1000 mg

p.o. once, diphenhydramine 25 mg IV once


 cc bolus








Hospital Course: 





Ms. Cantu was slightly tachycardic this morning post rehydration and continues 

to have a few bouts of diarrhea, treated with loperamide. Symptoms show mild 

thyrotoxicosis. (BWPS = 30)TSH low and T4 mildly elevated. Propranolol 10mg po 

BID started. Thyroid ultrasound shows some heterogeneous tissue, no masses. 

Thyroid antibody pending. Ms. Cantu is feeling much better and requests 

discharge.  


Vital Signs/Physical Exam: 














Temp Pulse Resp BP Pulse Ox


 


 98.4 F   86   18   99/64   98 


 


 10/08/24 15:44  10/08/24 15:44  10/08/24 15:44  10/08/24 15:44  10/08/24 15:44








General: Alert, In no apparent distress, Oriented x3


HEENT: Atraumatic, Normocephalic


Neck: Supple


Respiratory: Normal air movement


Cardiovascular: Regular rate/rhythm, Other (Slightly tachycardic at 105)


Capillary refill: <2 Seconds


Gastrointestinal: Normal bowel sounds


Musculoskeletal: No clubbing


Integumentary: No rashes


Neurological: Normal speech, Normal tone, Normal affect


Lymphatics: No axilla or inguinal lymphadenopathy


External genitalia: Deferred


Rectal: Deferred


Laboratory Data at Discharge: 














WBC  3.90 thou/uL (4.3-10.9)  L  10/08/24  07:14    


 


Hgb  10.4 g/dL (12.0-15.0)  L D 10/08/24  07:14    


 


Hct  31.1 % (36.0-45.0)  L  10/08/24  07:14    


 


Plt Count  284 thou/uL (152-406)  D 10/08/24  07:14    


 


PT  12.7 SECONDS (9.4-12.5)  H  10/07/24  16:36    


 


INR  1.14   10/07/24  16:36    


 


APTT  34.3 SECONDS (24.3-36.9)   10/07/24  16:36    


 


Sodium  140 mEq/L (136-145)   10/08/24  06:03    


 


Potassium  3.1 mEq/L (3.5-5.1)  L D 10/08/24  06:03    


 


BUN  6 mg/dL (7-18)  L  10/08/24  06:03    


 


Creatinine  0.44 mg/dL (0.55-1.02)  L  10/08/24  06:03    


 


Glucose  123 mg/dL ()  H  10/08/24  06:03    


 


Magnesium  1.7 mg/dL (1.6-2.4)   10/07/24  22:23    


 


Total Bilirubin  0.3 mg/dL (0.2-1.0)   10/08/24  06:03    


 


AST  27 U/L (15-37)   10/08/24  06:03    


 


ALT  55 U/L (13-56)   10/08/24  06:03    


 


Alkaline Phosphatase  90 U/L ()  D 10/08/24  06:03    








Home Medications: 








Loperamide HCl [Imodium A-D] 2 mg PO DAILY PRN #5 tab 10/08/24 


Loperamide HCl [Imodium A-D] 2 mg PO DAILY PRN #5 tab 10/08/24 


Propranolol [Inderal*] 10 mg PO BID #28 tab 10/08/24 


Propranolol [Inderal] 10 mg PO BID #28 tab 10/08/24 


predniSONE [Deltasone] 20 mg PO BID #11 tab 10/08/24 


predniSONE [Deltasone] 20 mg PO BID #11 tab 10/08/24 





New Medications: 


Loperamide HCl [Imodium A-D] 2 mg PO DAILY PRN #5 tab


 PRN Reason: Diarrhea


Loperamide HCl [Imodium A-D] 2 mg PO DAILY PRN #5 tab


 PRN Reason: Diarrhea


Propranolol [Inderal*] 10 mg PO BID #28 tab


Propranolol [Inderal] 10 mg PO BID #28 tab


predniSONE [Deltasone] 20 mg PO BID #11 tab


predniSONE [Deltasone] 20 mg PO BID #11 tab


Physician Discharge Instructions: 


-DC IV and DC home


-Follow-up with PCP in 1 to 2 weeks


-Follow-up with Endocrinology in 1 to 2 weeks


-Please call Dr. Cowan at [phone] if any questions regarding hospital stay


-Please call nursing station at [phone] if any nursing or medication 

questions


-Return to the emergency room if symptoms worsen


Diet: Regular


Activity: Ad velasquez


Followup: 


ROBERT HOWELL [Primary Care Provider] -

## 2024-10-08 NOTE — ECHO
HEIGHT: 4 ft 9 in   WEIGHT: 140 lb 0 oz   DATE OF STUDY: 10/08/24   REFER DR: Gilma Best FNP-BC

2-DIMENSIONAL: YES

     M.MODE: YES

 DOPPLER: YES

COLOR FLOW: YES



                    TDS:  

PORTABLE: YES

 DEFINITY:  

BUBBLE STUDY: 





DIAGNOSIS:  TACHYCARDIA, IMPENDING THYROID STORM ON ARRIVAL



CARDIAC HISTORY:  

CATHERIZATION: NO

SURGERY: NO

PROSTHETIC VALVE: NO

PACEMAKER: NO





MEASUREMENTS (cm)

    DIASTOLIC (NORMALS)                 SYSTOLIC (NORMALS)

IVSd                 1.0 (0.6-1.2)                    LA Diam 2.7 (1.9-4.0)     LVEF       
  65%  

LVIDd               3.8 (3.5-5.7)                        LVIDs      2.4 (2.0-3.5)     %FS  
        38%

LVPWd             1.0 (0.6-1.2)

Ao Diam           2.2 (2.0-3.7)



2 DIMENSIONAL ASSESSMENT:

RIGHT ATRIUM:                   NORMAL

LEFT ATRIUM:       NORMAL



RIGHT VENTRICLE:            NORMAL

LEFT VENTRICLE: NORMAL



TRICUSPID VALVE:             NORMAL

MITRAL VALVE:     NORMAL



PULMONIC VALVE:             NORMAL

AORTIC VALVE:     NORMAL



PERICARDIAL EFFUSION: NONE

AORTIC ROOT:      NORMAL





LEFT VENTRICULAR WALL MOTION:     NORMAL



DOPPLER/COLOR FLOW:     NORMAL



COMMENTS:      

  1. NORMAL LEFT VENTRICULAR SYSTOLIC FUNCTION, EJECTION FRACTION 65%, 

NORMAL WALL MOTION

  2. NORMAL DIASTOLIC FUNCTION



TECHNOLOGIST:   GAGAN RIZO

## 2024-10-08 NOTE — EKG
Test Date:    2024-10-07               Test Time:    19:52:32

Technician:   VINICIUS                                     

                                                     

MEASUREMENT RESULTS:                                       

Intervals:                                           

Rate:         129                                    

MN:           158                                    

QRSD:         68                                     

QT:           280                                    

QTc:          410                                    

Axis:                                                

P:            57                                     

MN:           158                                    

QRS:          15                                     

T:            27                                     

                                                     

INTERPRETIVE STATEMENTS:                                       

                                                     

Sinus tachycardia

Otherwise normal ECG

Compared to ECG 12/06/2022 19:55:18

Sinus bradycardia no longer present

Myocardial infarct finding no longer present



Electronically Signed On 10-08-24 12:25:02 CDT by Jose M Leonardo

## 2024-10-08 NOTE — EKG
Test Date:    2024-10-07               Test Time:    21:02:38

Technician:   ABEL                                     

                                                     

MEASUREMENT RESULTS:                                       

Intervals:                                           

Rate:         133                                    

AR:           162                                    

QRSD:         70                                     

QT:           272                                    

QTc:          404                                    

Axis:                                                

P:            55                                     

AR:           162                                    

QRS:          16                                     

T:            27                                     

                                                     

INTERPRETIVE STATEMENTS:                                       

                                                     

Sinus tachycardia

Otherwise normal ECG

Compared to ECG 10/07/2024 19:52:32

No significant changes



Electronically Signed On 10-08-24 12:24:59 CDT by Jose M Leonardo